# Patient Record
Sex: MALE | Race: WHITE | NOT HISPANIC OR LATINO | Employment: FULL TIME | URBAN - METROPOLITAN AREA
[De-identification: names, ages, dates, MRNs, and addresses within clinical notes are randomized per-mention and may not be internally consistent; named-entity substitution may affect disease eponyms.]

---

## 2019-04-30 DIAGNOSIS — R97.20 ELEVATED PROSTATE SPECIFIC ANTIGEN (PSA): Primary | ICD-10-CM

## 2023-03-14 ENCOUNTER — LAB REQUISITION (OUTPATIENT)
Dept: LAB | Facility: CLINIC | Age: 58
End: 2023-03-14
Payer: COMMERCIAL

## 2023-03-14 DIAGNOSIS — R22.32 LOCALIZED SWELLING, MASS AND LUMP, LEFT UPPER LIMB: ICD-10-CM

## 2023-03-14 PROCEDURE — 88304 TISSUE EXAM BY PATHOLOGIST: CPT | Mod: 26 | Performed by: PATHOLOGY

## 2023-03-14 PROCEDURE — 88304 TISSUE EXAM BY PATHOLOGIST: CPT | Mod: TC,ORL | Performed by: ORTHOPAEDIC SURGERY

## 2023-03-15 LAB
PATH REPORT.COMMENTS IMP SPEC: NORMAL
PATH REPORT.COMMENTS IMP SPEC: NORMAL
PATH REPORT.FINAL DX SPEC: NORMAL
PATH REPORT.GROSS SPEC: NORMAL
PATH REPORT.MICROSCOPIC SPEC OTHER STN: NORMAL
PATH REPORT.RELEVANT HX SPEC: NORMAL
PHOTO IMAGE: NORMAL

## 2023-09-07 ENCOUNTER — APPOINTMENT (OUTPATIENT)
Dept: CT IMAGING | Facility: CLINIC | Age: 58
End: 2023-09-07
Attending: EMERGENCY MEDICINE
Payer: COMMERCIAL

## 2023-09-07 ENCOUNTER — APPOINTMENT (OUTPATIENT)
Dept: MRI IMAGING | Facility: CLINIC | Age: 58
End: 2023-09-07
Attending: EMERGENCY MEDICINE
Payer: COMMERCIAL

## 2023-09-07 ENCOUNTER — HOSPITAL ENCOUNTER (OUTPATIENT)
Facility: CLINIC | Age: 58
Setting detail: OBSERVATION
Discharge: HOME | End: 2023-09-08
Attending: EMERGENCY MEDICINE | Admitting: HOSPITALIST
Payer: COMMERCIAL

## 2023-09-07 DIAGNOSIS — G45.9 TIA (TRANSIENT ISCHEMIC ATTACK): ICD-10-CM

## 2023-09-07 DIAGNOSIS — Q21.12 PFO (PATENT FORAMEN OVALE): ICD-10-CM

## 2023-09-07 DIAGNOSIS — I67.2 INTRACRANIAL ATHEROSCLEROSIS: Primary | ICD-10-CM

## 2023-09-07 DIAGNOSIS — R73.03 PREDIABETES: ICD-10-CM

## 2023-09-07 LAB
ANION GAP SERPL CALCULATED.3IONS-SCNC: 8 MMOL/L (ref 7–15)
ATRIAL RATE - MUSE: 60 BPM
BASOPHILS # BLD AUTO: 0 10E3/UL (ref 0–0.2)
BASOPHILS NFR BLD AUTO: 1 %
BUN SERPL-MCNC: 24.1 MG/DL (ref 8–23)
CALCIUM SERPL-MCNC: 10.5 MG/DL (ref 8.8–10.2)
CHLORIDE SERPL-SCNC: 104 MMOL/L (ref 98–107)
CREAT SERPL-MCNC: 1.04 MG/DL (ref 0.67–1.17)
DEPRECATED HCO3 PLAS-SCNC: 23 MMOL/L (ref 22–29)
DIASTOLIC BLOOD PRESSURE - MUSE: NORMAL MMHG
EGFRCR SERPLBLD CKD-EPI 2021: 82 ML/MIN/1.73M2
EOSINOPHIL # BLD AUTO: 0.2 10E3/UL (ref 0–0.7)
EOSINOPHIL NFR BLD AUTO: 3 %
ERYTHROCYTE [DISTWIDTH] IN BLOOD BY AUTOMATED COUNT: 13.6 % (ref 10–15)
GLUCOSE BLDC GLUCOMTR-MCNC: 92 MG/DL (ref 70–99)
GLUCOSE SERPL-MCNC: 91 MG/DL (ref 70–99)
HBA1C MFR BLD: 5.7 %
HCT VFR BLD AUTO: 40.3 % (ref 40–53)
HGB BLD-MCNC: 12.9 G/DL (ref 13.3–17.7)
HOLD SPECIMEN: NORMAL
HOLD SPECIMEN: NORMAL
IMM GRANULOCYTES # BLD: 0 10E3/UL
IMM GRANULOCYTES NFR BLD: 1 %
INTERPRETATION ECG - MUSE: NORMAL
LYMPHOCYTES # BLD AUTO: 1.2 10E3/UL (ref 0.8–5.3)
LYMPHOCYTES NFR BLD AUTO: 15 %
MCH RBC QN AUTO: 27.6 PG (ref 26.5–33)
MCHC RBC AUTO-ENTMCNC: 32 G/DL (ref 31.5–36.5)
MCV RBC AUTO: 86 FL (ref 78–100)
MONOCYTES # BLD AUTO: 0.8 10E3/UL (ref 0–1.3)
MONOCYTES NFR BLD AUTO: 10 %
NEUTROPHILS # BLD AUTO: 5.5 10E3/UL (ref 1.6–8.3)
NEUTROPHILS NFR BLD AUTO: 70 %
NRBC # BLD AUTO: 0 10E3/UL
NRBC BLD AUTO-RTO: 0 /100
P AXIS - MUSE: 41 DEGREES
PLATELET # BLD AUTO: 185 10E3/UL (ref 150–450)
POTASSIUM SERPL-SCNC: 4.4 MMOL/L (ref 3.4–5.3)
PR INTERVAL - MUSE: 144 MS
QRS DURATION - MUSE: 82 MS
QT - MUSE: 400 MS
QTC - MUSE: 400 MS
R AXIS - MUSE: 24 DEGREES
RBC # BLD AUTO: 4.67 10E6/UL (ref 4.4–5.9)
SODIUM SERPL-SCNC: 135 MMOL/L (ref 136–145)
SYSTOLIC BLOOD PRESSURE - MUSE: NORMAL MMHG
T AXIS - MUSE: 45 DEGREES
TROPONIN T SERPL HS-MCNC: 8 NG/L
VENTRICULAR RATE- MUSE: 60 BPM
WBC # BLD AUTO: 7.8 10E3/UL (ref 4–11)

## 2023-09-07 PROCEDURE — 70496 CT ANGIOGRAPHY HEAD: CPT

## 2023-09-07 PROCEDURE — 83036 HEMOGLOBIN GLYCOSYLATED A1C: CPT | Performed by: PHYSICIAN ASSISTANT

## 2023-09-07 PROCEDURE — 70498 CT ANGIOGRAPHY NECK: CPT

## 2023-09-07 PROCEDURE — 93005 ELECTROCARDIOGRAM TRACING: CPT

## 2023-09-07 PROCEDURE — 250N000011 HC RX IP 250 OP 636: Performed by: EMERGENCY MEDICINE

## 2023-09-07 PROCEDURE — 70553 MRI BRAIN STEM W/O & W/DYE: CPT

## 2023-09-07 PROCEDURE — G0378 HOSPITAL OBSERVATION PER HR: HCPCS

## 2023-09-07 PROCEDURE — 70450 CT HEAD/BRAIN W/O DYE: CPT

## 2023-09-07 PROCEDURE — 99285 EMERGENCY DEPT VISIT HI MDM: CPT | Mod: 25

## 2023-09-07 PROCEDURE — 85025 COMPLETE CBC W/AUTO DIFF WBC: CPT | Performed by: EMERGENCY MEDICINE

## 2023-09-07 PROCEDURE — 250N000009 HC RX 250: Performed by: EMERGENCY MEDICINE

## 2023-09-07 PROCEDURE — 80048 BASIC METABOLIC PNL TOTAL CA: CPT | Performed by: EMERGENCY MEDICINE

## 2023-09-07 PROCEDURE — 250N000013 HC RX MED GY IP 250 OP 250 PS 637: Performed by: EMERGENCY MEDICINE

## 2023-09-07 PROCEDURE — A9585 GADOBUTROL INJECTION: HCPCS | Performed by: EMERGENCY MEDICINE

## 2023-09-07 PROCEDURE — 255N000002 HC RX 255 OP 636: Performed by: EMERGENCY MEDICINE

## 2023-09-07 PROCEDURE — 99222 1ST HOSP IP/OBS MODERATE 55: CPT | Performed by: PHYSICIAN ASSISTANT

## 2023-09-07 PROCEDURE — 82962 GLUCOSE BLOOD TEST: CPT

## 2023-09-07 PROCEDURE — 84484 ASSAY OF TROPONIN QUANT: CPT | Performed by: EMERGENCY MEDICINE

## 2023-09-07 PROCEDURE — 36415 COLL VENOUS BLD VENIPUNCTURE: CPT | Performed by: EMERGENCY MEDICINE

## 2023-09-07 RX ORDER — MULTIPLE VITAMINS W/ MINERALS TAB 9MG-400MCG
1 TAB ORAL DAILY
COMMUNITY

## 2023-09-07 RX ORDER — GADOBUTROL 604.72 MG/ML
8 INJECTION INTRAVENOUS ONCE
Status: COMPLETED | OUTPATIENT
Start: 2023-09-07 | End: 2023-09-07

## 2023-09-07 RX ORDER — IOPAMIDOL 755 MG/ML
500 INJECTION, SOLUTION INTRAVASCULAR ONCE
Status: COMPLETED | OUTPATIENT
Start: 2023-09-07 | End: 2023-09-07

## 2023-09-07 RX ORDER — AMOXICILLIN 500 MG
1200 CAPSULE ORAL DAILY
COMMUNITY

## 2023-09-07 RX ORDER — ACETAMINOPHEN 325 MG/1
650 TABLET ORAL EVERY 4 HOURS PRN
Status: DISCONTINUED | OUTPATIENT
Start: 2023-09-07 | End: 2023-09-08 | Stop reason: HOSPADM

## 2023-09-07 RX ORDER — ASPIRIN 325 MG
325 TABLET ORAL ONCE
Status: COMPLETED | OUTPATIENT
Start: 2023-09-07 | End: 2023-09-07

## 2023-09-07 RX ORDER — CLOPIDOGREL BISULFATE 75 MG/1
300 TABLET ORAL ONCE
Status: COMPLETED | OUTPATIENT
Start: 2023-09-07 | End: 2023-09-07

## 2023-09-07 RX ORDER — LIDOCAINE 40 MG/G
CREAM TOPICAL
Status: DISCONTINUED | OUTPATIENT
Start: 2023-09-07 | End: 2023-09-08 | Stop reason: HOSPADM

## 2023-09-07 RX ADMIN — GADOBUTROL 8 ML: 604.72 INJECTION INTRAVENOUS at 12:10

## 2023-09-07 RX ADMIN — CLOPIDOGREL BISULFATE 300 MG: 75 TABLET ORAL at 14:52

## 2023-09-07 RX ADMIN — IOPAMIDOL 67 ML: 755 INJECTION, SOLUTION INTRAVENOUS at 11:20

## 2023-09-07 RX ADMIN — ASPIRIN 325 MG ORAL TABLET 325 MG: 325 PILL ORAL at 14:52

## 2023-09-07 RX ADMIN — SODIUM CHLORIDE 100 ML: 9 INJECTION, SOLUTION INTRAVENOUS at 11:20

## 2023-09-07 ASSESSMENT — ACTIVITIES OF DAILY LIVING (ADL)
ADLS_ACUITY_SCORE: 35
ADLS_ACUITY_SCORE: 31
ADLS_ACUITY_SCORE: 35
ADLS_ACUITY_SCORE: 31
ADLS_ACUITY_SCORE: 35

## 2023-09-07 NOTE — ED PROVIDER NOTES
"  History     Chief Complaint:  Generalized Weakness       HPI   Derek Abbasi is a 58 year old male who presents with generalized weakness and with \"feeling off\" since this morning around 915. At that time he took a shower after going for a walk and doing some push-ups when he began feeling generally weak and had trouble with his balance and brushing his teeth.  He noticed these issues more in the right arm as well as the right leg when he was trying to get his underwear on.  Here in the ER, he no longer has the symptoms, but his wife thinks he is slow to respond and \"seems twitchy.\" The patient denies shortness of breath, chest pain, and any recent illness.     Independent Historian:   None - Patient Only    Medications:    The patient is currently on no regular medications.    Past Medical History:    Elevated prostate specific antigen (PSA)  Normoc cystic anemia  Prostate cancer  Acute prostatitis    Past Surgical History:  Preoperative exam of left knee    Physical Exam   Patient Vitals for the past 24 hrs:   BP Temp Temp src Pulse Resp SpO2 Weight   09/07/23 1441 133/77 -- -- 71 -- 94 % --   09/07/23 1428 123/84 -- -- 70 22 93 % --   09/07/23 1413 116/69 -- -- 65 -- 93 % --   09/07/23 1358 101/60 -- -- 60 -- 95 % --   09/07/23 1343 125/68 -- -- 76 14 95 % --   09/07/23 1326 126/74 -- -- 63 19 97 % --   09/07/23 1259 -- -- -- 70 15 98 % --   09/07/23 1256 -- -- -- 70 15 96 % --   09/07/23 1247 (!) 143/79 -- -- 67 26 98 % --   09/07/23 1157 -- -- -- 60 20 96 % --   09/07/23 1019 (!) 144/82 -- -- -- -- -- --   09/07/23 1016 -- 97.7  F (36.5  C) Temporal 73 18 99 % 81.7 kg (180 lb 1.9 oz)        Physical Exam  VS: Reviewed per above  HENT: Mucous membranes moist, no nuchal rigidity  EYES: sclera anicteric  CV: Rate as noted, no regular rhythm.   RESP: Effort normal. Breath sounds are normal bilaterally.  GI: no tenderness/rebound/guarding, not distended.  NEURO: GCS 15, cranial nerves II through XII are intact, " 5 out of 5 strength in all 4 extremities, sensation is intact light touch in all 4 extremities. No ataxia, normal FNF testing BL.  MSK: No deformity of the extremities  SKIN: Warm and dry    Emergency Department Course   ECG:  ECG results from 09/07/23   EKG 12 lead     Value    Systolic Blood Pressure     Diastolic Blood Pressure     Ventricular Rate 60    Atrial Rate 60    IL Interval 144    QRS Duration 82        QTc 400    P Axis 41    R AXIS 24    T Axis 45    Interpretation ECG      Sinus rhythm  Normal ECG  No previous ECGs available         Imaging:  MR Brain w/o & w Contrast   Preliminary Result   IMPRESSION: Unremarkable brain MRI for age. No acute intracranial   process.      CTA Head Neck with Contrast   Preliminary Result   IMPRESSION:   1. No large vessel occlusion identified intracranially.   2. Moderate to severe focal stenosis at the origin of the right   anterior cerebral artery. Moderate focal stenosis of the adjacent   supraclinoid right internal carotid artery.   3. Mild to moderate focal stenosis of the mid to distal M1 segment of   the left middle cerebral artery. Mild stenoses in the P2 segment of   the left posterior cerebral artery.   4. No intracranial aneurysm or high flow vascular formation.   5. Patent cervical carotid and vertebral arteries without significant   stenosis.      Head CT w/o contrast   Preliminary Result   IMPRESSION: No CT findings of acute intracranial process.         Report per radiology    Laboratory:  Labs Ordered and Resulted from Time of ED Arrival to Time of ED Departure   BASIC METABOLIC PANEL - Abnormal       Result Value    Sodium 135 (*)     Potassium 4.4      Chloride 104      Carbon Dioxide (CO2) 23      Anion Gap 8      Urea Nitrogen 24.1 (*)     Creatinine 1.04      Calcium 10.5 (*)     Glucose 91      GFR Estimate 82     CBC WITH PLATELETS AND DIFFERENTIAL - Abnormal    WBC Count 7.8      RBC Count 4.67      Hemoglobin 12.9 (*)     Hematocrit 40.3   "    MCV 86      MCH 27.6      MCHC 32.0      RDW 13.6      Platelet Count 185      % Neutrophils 70      % Lymphocytes 15      % Monocytes 10      % Eosinophils 3      % Basophils 1      % Immature Granulocytes 1      NRBCs per 100 WBC 0      Absolute Neutrophils 5.5      Absolute Lymphocytes 1.2      Absolute Monocytes 0.8      Absolute Eosinophils 0.2      Absolute Basophils 0.0      Absolute Immature Granulocytes 0.0      Absolute NRBCs 0.0     TROPONIN T, HIGH SENSITIVITY - Normal    Troponin T, High Sensitivity 8     GLUCOSE BY METER - Normal    GLUCOSE BY METER POCT 92     GLUCOSE MONITOR NURSING POCT      Emergency Department Course & Assessments:     Interventions:  Medications   sodium chloride 0.9 % bag 500 mL for CT scan flush use (100 mLs As instructed $Given 9/7/23 1120)   iopamidol (ISOVUE-370) solution 500 mL (67 mLs Intravenous $Given 9/7/23 1120)   gadobutrol (GADAVIST) injection 8 mL (8 mLs Intravenous $Given 9/7/23 1210)   aspirin (ASA) tablet 325 mg (325 mg Oral $Given 9/7/23 1452)   clopidogrel (PLAVIX) tablet 300 mg (300 mg Oral $Given 9/7/23 1452)        Assessments:  1020 I obtained history and examined the patient, as noted above.  1310 I rechecked and updated the patient.        Consultations/Discussion of Management or Tests:  1418 I spoke with Dr. Frances from the Neurology Department about the patient's presentation, findings, and plan of care.  1445 I spoke with SHORTY Lee, admitting for Dr. Torres about the patient's presentation, findings, and plan of care.      Disposition:  The patient was admitted to the hospital under care of Dr. Torres, who accepted the patient for care.    Impression & Plan      Medical Decision Making:  Patient presents to the ER after episode of what seems to be right-sided weakness and trouble with coordination lasting about half an hour.  Vital signs reassuring.  Here in the ER, patient still feels \"off\" but does not have objective neuro findings.  " Initial CT of the head and CTA head and neck did show some nonspecific intracerebral areas of narrowing.  No ICH.  MRI did not show stroke.  Discussed with stroke neurology with recommendation for aspirin, Plavix load as well as admission for further TIA/stroke evaluation.    Critical Care time:  was 0 minutes for this patient excluding procedures.    Diagnosis:    ICD-10-CM    1. TIA (transient ischemic attack)  G45.9            Scribe Disclosure:  I, Franco Blanco, am serving as a scribe at 10:58 AM on 9/7/2023 to document services personally performed by Wilfredo Saucedo MD based on my observations and the provider's statements to me.      9/7/2023   Wilfredo Saucedo MD Lindenbaum, Elan, MD  09/07/23 1532

## 2023-09-07 NOTE — PLAN OF CARE
PRIMARY DIAGNOSIS: TIA  OUTPATIENT/OBSERVATION GOALS TO BE MET BEFORE DISCHARGE:  ADLs back to baseline: Yes    Activity and level of assistance: Ambulating independently - educated on fall risks, Pt declines fall interventions and wishes to independently walk halls, Pt denies all symptoms of TIA he was experiencing PTA    Pain status: Pain free.    Return to near baseline physical activity: Yes     Discharge Planner Nurse   Safe discharge environment identified: Yes  Barriers to discharge: Yes       Entered by: Radha Cunningham RN 09/07/2023 6:43 PM   Pt denies all symptoms of TIA experienced PTA.  AxOx4.  Requesting to ambulate unit independently.  Educated on fall risks.  Denies pain.    Please review provider order for any additional goals.   Nurse to notify provider when observation goals have been met and patient is ready for discharge.Goal Outcome Evaluation:

## 2023-09-07 NOTE — ED NOTES
"[facility]  ED Nurse Handoff Report    ED Chief complaint: Generalized Weakness  . ED Diagnosis:   Final diagnoses:   TIA (transient ischemic attack)       Allergies: No Known Allergies    Code Status: Full Code    Activity level - Baseline/Home:  independent.  Activity Level - Current:   independent.   Lift room needed: No.   Bariatric: No   Needed: No   Isolation: No.   Infection: Not Applicable.     Respiratory status: Room air    Vital Signs (within 30 minutes):   Vitals:    09/07/23 1343 09/07/23 1358 09/07/23 1413 09/07/23 1428   BP: 125/68 101/60 116/69 123/84   Pulse: 76 60 65 70   Resp: 14   22   Temp:       TempSrc:       SpO2: 95% 95% 93% 93%   Weight:           Cardiac Rhythm:  ,      Pain level:    Patient confused: No.   Patient Falls Risk: nonskid shoes/slippers when out of bed.   Elimination Status: Has voided     Patient Report - Initial Complaint: Generalized Weakness   Focused Assessment: Derek Abbasi is a 58 year old male who presents with generalized weakness, notably \"feeling off\" since this morning around 915. At that time he took a shower after going for a walk and doing some push-ups when he began feeling generally weak and had trouble with his balance and brushing his teeth. Here in the ER, he no longer has the symptoms, but his wife thinks he is slow to respond and \"seems twitchy.\" The patient denies shortness of breath, chest pain, and any recent illness.      Abnormal Results:   Labs Ordered and Resulted from Time of ED Arrival to Time of ED Departure   BASIC METABOLIC PANEL - Abnormal       Result Value    Sodium 135 (*)     Potassium 4.4      Chloride 104      Carbon Dioxide (CO2) 23      Anion Gap 8      Urea Nitrogen 24.1 (*)     Creatinine 1.04      Calcium 10.5 (*)     Glucose 91      GFR Estimate 82     CBC WITH PLATELETS AND DIFFERENTIAL - Abnormal    WBC Count 7.8      RBC Count 4.67      Hemoglobin 12.9 (*)     Hematocrit 40.3      MCV 86   "    MCH 27.6      MCHC 32.0      RDW 13.6      Platelet Count 185      % Neutrophils 70      % Lymphocytes 15      % Monocytes 10      % Eosinophils 3      % Basophils 1      % Immature Granulocytes 1      NRBCs per 100 WBC 0      Absolute Neutrophils 5.5      Absolute Lymphocytes 1.2      Absolute Monocytes 0.8      Absolute Eosinophils 0.2      Absolute Basophils 0.0      Absolute Immature Granulocytes 0.0      Absolute NRBCs 0.0     TROPONIN T, HIGH SENSITIVITY - Normal    Troponin T, High Sensitivity 8     GLUCOSE BY METER - Normal    GLUCOSE BY METER POCT 92     GLUCOSE MONITOR NURSING POCT        MR Brain w/o & w Contrast   Preliminary Result   IMPRESSION: Unremarkable brain MRI for age. No acute intracranial   process.      CTA Head Neck with Contrast   Preliminary Result   IMPRESSION:   1. No large vessel occlusion identified intracranially.   2. Moderate to severe focal stenosis at the origin of the right   anterior cerebral artery. Moderate focal stenosis of the adjacent   supraclinoid right internal carotid artery.   3. Mild to moderate focal stenosis of the mid to distal M1 segment of   the left middle cerebral artery. Mild stenoses in the P2 segment of   the left posterior cerebral artery.   4. No intracranial aneurysm or high flow vascular formation.   5. Patent cervical carotid and vertebral arteries without significant   stenosis.      Head CT w/o contrast   Preliminary Result   IMPRESSION: No CT findings of acute intracranial process.          Treatments provided: Plavix, ASA  Family Comments: Wife at bedside.   OBS brochure/video discussed/provided to patient:  Yes  ED Medications:   Medications   sodium chloride 0.9 % bag 500 mL for CT scan flush use (100 mLs As instructed $Given 9/7/23 1120)   aspirin (ASA) tablet 325 mg (has no administration in time range)   clopidogrel (PLAVIX) tablet 300 mg (has no administration in time range)   iopamidol (ISOVUE-370) solution 500 mL (67 mLs Intravenous  $Given 9/7/23 1120)   gadobutrol (GADAVIST) injection 8 mL (8 mLs Intravenous $Given 9/7/23 1210)       Drips infusing:  No  For the majority of the shift this patient was Green.   Interventions performed were N/A.    Sepsis treatment initiated: No    Cares/treatment/interventions/medications to be completed following ED care: See Orders    ED Nurse Name: Carito Turner RN  2:47 PM   RECEIVING UNIT ED HANDOFF REVIEW    Above ED Nurse Handoff Report was reviewed: Yes  Reviewed by: Radha Cunningham RN on September 7, 2023 at 3:12 PM

## 2023-09-07 NOTE — ED NOTES
"PIT/Triage Evaluation    Patient presented with feeling \"off\".  This morning around 915, patient was in the shower after going for a walk and doing some push-ups when he felt generally weak and had trouble brushing his teeth and had trouble with his balance.  Here in the ER he no longer has the symptoms but his wife thinks he seems \"twitchy\" and is slower to respond to things.    Exam is notable for:    Patient Vitals for the past 24 hrs:   BP Temp Temp src Pulse Resp SpO2 Weight   09/07/23 1019 (!) 144/82 -- -- -- -- -- --   09/07/23 1016 -- 97.7  F (36.5  C) Temporal 73 18 99 % 81.7 kg (180 lb 1.9 oz)     No facial asymmetry, EOMI, no drift in the extremities, normal finger-nose-finger testing bilaterally, no ataxia, sensation intact to light touch in all 4 extremities.    Appropriate interventions for symptom management were initiated if applicable.  Appropriate diagnostic tests were initiated if indicated.    Important information for subsequent clinician:  Patient presents after episode of neurological symptoms.  Here in the ER lingering deficits include feeling \"twitchy\" as well as \"slow\".  I do not appreciate focality and thus I do not believe he is suffering from CVA.  Furthermore I discussed the risks of thrombolytics and we agreed that risk would outweigh any potential benefit profile for his current symptoms. TIA is on the differential.  CT/CTA will be ordered patient awaits ER bed.    I briefly evaluated the patient and developed an initial plan of care. I discussed this plan and explained that this brief interaction does not constitute a full evaluation. Patient/family understands that they should wait to be fully evaluated and discuss any test results with another clinician prior to leaving the hospital.       Wilfredo Saucedo MD  09/07/23 1036    "

## 2023-09-07 NOTE — PHARMACY-ADMISSION MEDICATION HISTORY
Pharmacist Admission Medication History    Admission medication history is complete. The information provided in this note is only as accurate as the sources available at the time of the update.    Medication reconciliation/reorder completed by provider prior to medication history? No    Information Source(s): Patient via in-person    Pertinent Information: None    Changes made to PTA medication list:  Added: All  Deleted: None  Changed: None    Medication Affordability:  Not including over the counter (OTC) medications, was there a time in the past 3 months when you did not take your medications as prescribed because of cost?: No    Allergies reviewed with patient and updates made in EHR: yes    Medication History Completed By: Toyin Null RPH 9/7/2023 3:17 PM    Prior to Admission medications    Medication Sig Last Dose Taking? Auth Provider Long Term End Date   multivitamin w/minerals (THERA-VIT-M) tablet Take 1 tablet by mouth daily 9/7/2023 at am Yes Unknown, Entered By History     Omega-3 Fatty Acids (FISH OIL) 1200 MG capsule Take 1,200 mg by mouth daily 9/7/2023 at am Yes Unknown, Entered By History

## 2023-09-07 NOTE — ED TRIAGE NOTES
Pt awoke this morning, went on a walk, and completed daily exercise. Shortly after pt c/o dizziness, difficulty concentrating, and difficulty brushing teeth. While getting EKG pt had an episode where he was staring off and did not answer ERT. Pt also c/o fidgeting and shaking. Denies hx of seizures or CVA. Stat stroke evaluation called in triage 2. ABC in tact. A/OX4

## 2023-09-07 NOTE — PLAN OF CARE
ROOM # 213-1    Living Situation (if not independent, order SW consult):  Facility name:  : N/A    Activity level at baseline: Ind    Activity level on admit: SBA    Who will be transporting you at discharge: Wife    Patient registered to observation; given Patient Bill of Rights; given the opportunity to ask questions about observation status and their plan of care.  Patient has been oriented to the observation room, bathroom and call light is in place.    Discussed discharge goals and expectations with patient/family.         Goal Outcome Evaluation:

## 2023-09-07 NOTE — H&P
[facility]    History and Physical - Hospitalist Service       Date of Admission:  9/7/2023    Assessment & Plan Derek Abbasi is a 58 year old male who denies any significant Pmhx, who was admitted on 9/7/2023 after presenting for evaluation of atypical neurological symptoms of disorientation, global weakness, coordination impairment.     Atypical Neurologic Symptoms   Admitted obs for TIA work up although symptoms not classic for TIA. Episode of incoordination, global weakness bilateral LE, possible involuntary twitching.  Associated lightheadedness, no chest pain, syncope or dyspnea.  Occurred after physical exertion with push ups.  Symptoms resolved at the time evaluated in the ED code stroke negative.    Vascular neurology was consulted given dual antiplatelet therapy admitted observation.  -Vascular neurology consult  -check orthostatic BP   -Stroke order set initiated discontinued unnecessary measures given he is asymptomatic now and will not need therapy evals  -Cardiac telemetry    Cerebral Artery Stenosis  Noted on CTA, no large vessel occlusion.  Unclear if related to presenting symptoms.  Moderate to severe focal stenosis origin of right RANDAL, right ICA. Mild to moderate focal stenosis of the mid to distal M1 segment of the left middle cerebral artery. Mild stenoses in the P2 segment of the left posterior cerebral artery.  -Defer further antiplatelet therapy to vascular neurology recommendations       Diet:  regular     DVT Prophylaxis: Ambulate every shift  Glass Catheter: Not present    Cardiac Monitoring: None    Code Status:  Full Code     Clinically Significant Risk Factors Present on Admission           # Hypercalcemia: Highest Ca = 10.5 mg/dL in last 2 days, will monitor as appropriate                        Disposition Plan      Expected Discharge Date: 09/08/2023                  Kierra Coronel  "PA-C    ______________________________________________________________________    Chief Complaint   \"Disorientation, gait problem\"    History is obtained from the patient    History of Present Illness   Derek Abbasi is a 58 year old male who was referred to the emergency department from urgent care for evaluation of weakness, balance problem.  Dad woke up in his usual state of health.  This morning around 830 he had a bowl of oatmeal.  He went out for a walk with his dog.  He was feeling normal.  On return home he had been doing push-ups which she had not done for a while.  He started to feel queasy like he does when he physically exerts himself.  He got into the shower when he noticed he had weakness in both of his arms he had difficulty squeegee in the shower doors on his way out.  He reported feeling \"off \"fuzzy \"equated to lightheadedness.  No LOC. He had no palpitations, chest pain or shortness of breath during this episode.  After the episode his wife noted that he was may be doing some involuntary twitching.  Most of this had resolved by the time he was evaluated in urgent care he was walking normally.  He said that he had felt as if he had been drinking alcohol although he hadn't been. Dressing was initially difficulty due to coordination problems.  No nausea, headache, vision changes.  Weakness was not unilateral.  No history of stroke, seizure, syncopal episode.  He reports being healthy, physically active.  He reports that his blood pressures are usually on the low end.  He has no residual symptoms in the ED or on my evaluation.  On presentation symptoms largely resolved. Stroke Neurology was consulted. Code stroke de-escalated   In the ED given ASA, plavix.     Admission was requested from hospitalist service for \"TIA\".    Past Medical History    As above    Past Surgical History   As above.     Prior to Admission Medications   None        Review of Systems    The 10 point Review of Systems is " negative other than noted in the HPI or here.     Social History   I have reviewed this patient's social history and updated it with pertinent information if needed. Lives with wife, semi-retired works 2 days/week. No tobacco, alcohol use.          Physical Exam   Vital Signs: Temp: 97.7  F (36.5  C) Temp src: Temporal BP: 126/74 Pulse: 63   Resp: 19 SpO2: 97 % O2 Device: None (Room air)    Weight: 180 lbs 1.85 oz    Constitutional: Awake, alert,  no apparent distress.  Eyes: Conjunctiva and pupils examined and normal.  HEENT: Moist mucous membranes, normal dentition.  Respiratory: Clear to auscultation bilaterally, no crackles or wheezing.  Cardiovascular: Regular rate and rhythm, normal S1 and S2, and no murmur noted.  GI: Soft, non-distended, non-tender, bowel sounds present. No rebound tenderness or guarding.  Lymph/Hematologic: No anterior cervical or supraclavicular adenopathy.  Skin: No rashes, no cyanosis, no edema.  Musculoskeletal: No deformities noted.  No erythema or tenderness. Moving all extremities.  Neurologic: No focal deficits noted. Speech is clear. Coordination and strength grossly normal.   Psychiatric: Appropriate affect.       Medical Decision Making       65 MINUTES SPENT BY ME on the date of service doing chart review, history, exam, documentation & further activities per the note.      Data   ------------------------- PAST 24 HR DATA REVIEWED -----------------------------------------------

## 2023-09-08 ENCOUNTER — APPOINTMENT (OUTPATIENT)
Dept: CARDIOLOGY | Facility: CLINIC | Age: 58
End: 2023-09-08
Attending: PHYSICIAN ASSISTANT
Payer: COMMERCIAL

## 2023-09-08 VITALS
WEIGHT: 179 LBS | HEART RATE: 67 BPM | BODY MASS INDEX: 25.62 KG/M2 | DIASTOLIC BLOOD PRESSURE: 71 MMHG | SYSTOLIC BLOOD PRESSURE: 134 MMHG | OXYGEN SATURATION: 96 % | HEIGHT: 70 IN | TEMPERATURE: 98.3 F | RESPIRATION RATE: 18 BRPM

## 2023-09-08 LAB
AMPHETAMINES UR QL SCN: NORMAL
ANION GAP SERPL CALCULATED.3IONS-SCNC: 7 MMOL/L (ref 7–15)
BARBITURATES UR QL SCN: NORMAL
BENZODIAZ UR QL SCN: NORMAL
BUN SERPL-MCNC: 21.4 MG/DL (ref 8–23)
BZE UR QL SCN: NORMAL
CALCIUM SERPL-MCNC: 9.7 MG/DL (ref 8.8–10.2)
CANNABINOIDS UR QL SCN: NORMAL
CHLORIDE SERPL-SCNC: 105 MMOL/L (ref 98–107)
CHOLEST SERPL-MCNC: 139 MG/DL
CREAT SERPL-MCNC: 0.98 MG/DL (ref 0.67–1.17)
DEPRECATED HCO3 PLAS-SCNC: 24 MMOL/L (ref 22–29)
EGFRCR SERPLBLD CKD-EPI 2021: 88 ML/MIN/1.73M2
ERYTHROCYTE [DISTWIDTH] IN BLOOD BY AUTOMATED COUNT: 13.6 % (ref 10–15)
GLUCOSE BLDC GLUCOMTR-MCNC: 95 MG/DL (ref 70–99)
GLUCOSE SERPL-MCNC: 94 MG/DL (ref 70–99)
HCT VFR BLD AUTO: 39.6 % (ref 40–53)
HDLC SERPL-MCNC: 40 MG/DL
HGB BLD-MCNC: 13.1 G/DL (ref 13.3–17.7)
LDLC SERPL CALC-MCNC: 88 MG/DL
LVEF ECHO: NORMAL
MCH RBC QN AUTO: 28.4 PG (ref 26.5–33)
MCHC RBC AUTO-ENTMCNC: 33.1 G/DL (ref 31.5–36.5)
MCV RBC AUTO: 86 FL (ref 78–100)
NONHDLC SERPL-MCNC: 99 MG/DL
OPIATES UR QL SCN: NORMAL
PCP QUAL URINE (ROCHE): NORMAL
PLAT MORPH BLD: NORMAL
PLATELET # BLD AUTO: 165 10E3/UL (ref 150–450)
POTASSIUM SERPL-SCNC: 4.5 MMOL/L (ref 3.4–5.3)
RBC # BLD AUTO: 4.62 10E6/UL (ref 4.4–5.9)
RBC MORPH BLD: NORMAL
SODIUM SERPL-SCNC: 136 MMOL/L (ref 136–145)
TRIGL SERPL-MCNC: 55 MG/DL
WBC # BLD AUTO: 6.7 10E3/UL (ref 4–11)

## 2023-09-08 PROCEDURE — 250N000013 HC RX MED GY IP 250 OP 250 PS 637: Performed by: PHYSICIAN ASSISTANT

## 2023-09-08 PROCEDURE — G0378 HOSPITAL OBSERVATION PER HR: HCPCS

## 2023-09-08 PROCEDURE — 80061 LIPID PANEL: CPT | Performed by: PHYSICIAN ASSISTANT

## 2023-09-08 PROCEDURE — 999N000226 HC STATISTIC SLP IP EVAL DEFER

## 2023-09-08 PROCEDURE — 82962 GLUCOSE BLOOD TEST: CPT

## 2023-09-08 PROCEDURE — 999N000208 ECHOCARDIOGRAM COMPLETE

## 2023-09-08 PROCEDURE — 258N000001 HC RX 258: Performed by: HOSPITALIST

## 2023-09-08 PROCEDURE — 80307 DRUG TEST PRSMV CHEM ANLYZR: CPT | Performed by: PHYSICIAN ASSISTANT

## 2023-09-08 PROCEDURE — 99239 HOSP IP/OBS DSCHRG MGMT >30: CPT | Performed by: NURSE PRACTITIONER

## 2023-09-08 PROCEDURE — 93306 TTE W/DOPPLER COMPLETE: CPT

## 2023-09-08 PROCEDURE — 93306 TTE W/DOPPLER COMPLETE: CPT | Mod: 26 | Performed by: INTERNAL MEDICINE

## 2023-09-08 PROCEDURE — 36415 COLL VENOUS BLD VENIPUNCTURE: CPT | Performed by: PHYSICIAN ASSISTANT

## 2023-09-08 PROCEDURE — 93272 ECG/REVIEW INTERPRET ONLY: CPT | Performed by: INTERNAL MEDICINE

## 2023-09-08 PROCEDURE — 93270 REMOTE 30 DAY ECG REV/REPORT: CPT

## 2023-09-08 PROCEDURE — 80048 BASIC METABOLIC PNL TOTAL CA: CPT | Performed by: PHYSICIAN ASSISTANT

## 2023-09-08 PROCEDURE — 85027 COMPLETE CBC AUTOMATED: CPT | Performed by: PHYSICIAN ASSISTANT

## 2023-09-08 PROCEDURE — 99204 OFFICE O/P NEW MOD 45 MIN: CPT | Mod: G0 | Performed by: PHYSICIAN ASSISTANT

## 2023-09-08 RX ORDER — ATORVASTATIN CALCIUM 40 MG/1
40 TABLET, FILM COATED ORAL EVERY EVENING
Qty: 30 TABLET | Refills: 3 | Status: SHIPPED | OUTPATIENT
Start: 2023-09-08 | End: 2023-09-28

## 2023-09-08 RX ORDER — ATORVASTATIN CALCIUM 40 MG/1
40 TABLET, FILM COATED ORAL EVERY EVENING
Status: DISCONTINUED | OUTPATIENT
Start: 2023-09-08 | End: 2023-09-08 | Stop reason: HOSPADM

## 2023-09-08 RX ORDER — ASPIRIN 325 MG
325 TABLET, DELAYED RELEASE (ENTERIC COATED) ORAL DAILY
Status: DISCONTINUED | OUTPATIENT
Start: 2023-09-08 | End: 2023-09-08 | Stop reason: HOSPADM

## 2023-09-08 RX ORDER — ACETAMINOPHEN 325 MG/1
650 TABLET ORAL EVERY 4 HOURS PRN
COMMUNITY
Start: 2023-09-08

## 2023-09-08 RX ORDER — CLOPIDOGREL BISULFATE 75 MG/1
75 TABLET ORAL DAILY
Qty: 30 TABLET | Refills: 3 | Status: SHIPPED | OUTPATIENT
Start: 2023-09-09 | End: 2023-10-24

## 2023-09-08 RX ORDER — ASPIRIN 325 MG
325 TABLET, DELAYED RELEASE (ENTERIC COATED) ORAL DAILY
Qty: 30 TABLET | Status: SHIPPED | OUTPATIENT
Start: 2023-09-09 | End: 2023-10-24

## 2023-09-08 RX ORDER — CLOPIDOGREL BISULFATE 75 MG/1
75 TABLET ORAL DAILY
Status: DISCONTINUED | OUTPATIENT
Start: 2023-09-08 | End: 2023-09-08 | Stop reason: HOSPADM

## 2023-09-08 RX ORDER — ACYCLOVIR 200 MG/1
30 CAPSULE ORAL ONCE
Status: COMPLETED | OUTPATIENT
Start: 2023-09-08 | End: 2023-09-08

## 2023-09-08 RX ADMIN — SODIUM CHLORIDE 30 ML: 9 INJECTION, SOLUTION INTRAMUSCULAR; INTRAVENOUS; SUBCUTANEOUS at 11:39

## 2023-09-08 RX ADMIN — ASPIRIN 325 MG: 325 TABLET, DELAYED RELEASE ORAL at 11:39

## 2023-09-08 RX ADMIN — CLOPIDOGREL BISULFATE 75 MG: 75 TABLET ORAL at 15:15

## 2023-09-08 ASSESSMENT — ACTIVITIES OF DAILY LIVING (ADL)
ADLS_ACUITY_SCORE: 31

## 2023-09-08 NOTE — PLAN OF CARE
PRIMARY DIAGNOSIS: TIA  OUTPATIENT/OBSERVATION GOALS TO BE MET BEFORE DISCHARGE:  1. Orthostatic performed: N/A    2. Diagnostic testing complete & at baseline neurologic testing: Yes    3. Cleared by consultants (if involved): No    4. Interpretation of cardiac rhythm per telemetry tech: SB/SR    5. Tolerating adequate PO diet and medications: Yes    6. Return to near baseline physical activity or neurologic status: Yes    Discharge Planner Nurse   Safe discharge environment identified: Yes  Barriers to discharge: Yes       Entered by: Floridalma Ugalde RN 09/08/2023      Please review provider order for any additional goals.   Nurse to notify provider when observation goals have been met and patient is ready for discharge.

## 2023-09-08 NOTE — PLAN OF CARE
PRIMARY DIAGNOSIS: TIA  OUTPATIENT/OBSERVATION GOALS TO BE MET BEFORE DISCHARGE:  1. Orthostatic performed: N/A    2. Diagnostic testing complete & at baseline neurologic testing: Yes    3. Cleared by consultants (if involved): No    4. Interpretation of cardiac rhythm per telemetry tech: SB/SR    5. Tolerating adequate PO diet and medications: Yes    6. Return to near baseline physical activity or neurologic status: Yes    Discharge Planner Nurse   Safe discharge environment identified: Yes  Barriers to discharge: Yes       Entered by: Floridalma Ugalde RN 09/08/2023      Please review provider order for any additional goals.   Nurse to notify provider when observation goals have been met and patient is ready for discharge.    AOx4. Up ad jennifer. Neuros WNL. SB/SR per tele. Wife at bedside. Echo complete- results pending. Tele stroke complete-awaiting recommendations.

## 2023-09-08 NOTE — PROGRESS NOTES
SUBJECTIVE:                                                    Homer Wells is a 47 year old male who presents to clinic today for the following health issues:    Depression and Anxiety Follow-Up      Status since last visit: No change    Other associated symptoms:None    Complicating factors:     Significant life event: No     Current substance abuse: alcohol 3++ beers 3-4 nites a wk     meds i past:    paxil -no help and wt gain    efefxor- helped but late to lack of ejaculation     PHQ-9 SCORE 6/4/2015 3/21/2016 11/15/2016   Total Score 2 - -   Total Score - 2 8     No flowsheet data found.     PHQ-9  English =14     PHQ-9   Any Language     GAD7=11       Hypertension Follow-up      Outpatient blood pressures are being checked at home.  Results are 133/78, 148/88.    Low Salt Diet: no added salt     RT Sided Low Back Pain        Duration: Ongoing        Specific cause: none    Description:   Location of pain: low back right  Character of pain: sharp  Pain radiation:none  New numbness or weakness in legs, not attributed to pain:  no     Intensity: Currently 0-1/10, worst 8/10-when spasming    History:   Pain interferes with job: YES  History of back problems: patient has prior back spasms  Any previous MRI or X-rays: yes-very long time ago, pt does not remember when  Sees a specialist for back pain:  No  Therapies tried without relief: naproxen, heat and ice    Alleviating factors:   Improved by: laying down      Precipitating factors:  Worsened by: Bending and Sitting    Functional and Psychosocial Screen (González STarT Back):      Not performed today   Accompanying Signs & Symptoms:  Risk of Fracture:  None  Risk of Cauda Equina:  None  Risk of Infection:  None  Risk of Cancer:  None  Risk of Ankylosing Spondylitis:  Onset at age <35, male, AND morning back stiffness. no                Amount of exercise or physical activity: None    Problems taking medications regularly: No    Medication side effects:  30-day Event Monitor placed.   none    Diet: regular (no restrictions)    Gout  Duration: decades but no episodes for 10 + yrs despite drinking alcohol   Description   Location: big toe - bilateral  Joint Swelling: YES  Redness: YES  Pain intensity:  moderate  Accompanying signs and symptoms: None  History  Previous history of gout: YES   Trauma to the area: no   Precipitating factors:   Alcohol usage: Frequent  Diuretic use: no   Recent illness: no   Therapies tried and outcome: allopurinol 300mgm a day and no gout since started      ALCOHOL ABUSE      Duration: LATE TEENS     Description (location/character/radiation): off 3029-8542 but back on 3+++ beer 3-4 nites a wk     Intensity:  severe    Accompanying signs and symptoms: 0    History (similar episodes/previous evaluation): None    Precipitating or alleviating factors: None    Therapies tried and outcome: AA in past        HYPOXIA/SLEEP APNEA       Duration: yrs     Description (location/character/radiation): oximetry 95%     Intensity:  mild    Accompanying signs and symptoms: fatigue     History (similar episodes/previous evaluation): None    Precipitating or alleviating factors: morbidly obese     Therapies tried and outcome:  c Pap q nite      TOBACCO ABUSE    -1-2 ppd since 16 - 30 y/o   - sister is dying of lung ca at 51 y/o = a smoker   -assymptomatic     MORBID OBESITY    -BMI = 43  -comorbid HTN   -hypoxic to 95%     FAMILY HX OF COLON CA    -father with it at 64 y/o   -pt had 1st colonoscopy in 2003   - so is due again     Problem list and histories reviewed & adjusted, as indicated.  Additional history: as documented    Labs reviewed in EPIC    Reviewed and updated as needed this visit by clinical staff  Allergies  Meds  Problems       Reviewed and updated as needed this visit by Provider  Allergies  Meds  Problems         ROS:  C: NEGATIVE for fever, chills, change in weight- conts to gain on beer   I: NEGATIVE for worrisome rashes, moles or lesions  E: NEGATIVE for  "vision changes or irritation  E/M: NEGATIVE for ear, mouth and throat problems  R: NEGATIVE for significant cough or SOB  B: NEGATIVE for masses, tenderness or discharge  CV: NEGATIVE for chest pain, palpitations or peripheral edema  GI: NEGATIVE for nausea, abdominal pain, heartburn, or change in bowel habits  : NEGATIVE for frequency, dysuria, or hematuria  M: NEGATIVE for significant arthralgias or myalgia  N: NEGATIVE for weakness, dizziness or paresthesias  E: NEGATIVE for temperature intolerance, skin/hair changes  H: NEGATIVE for bleeding problems  P: NEGATIVE for changes in mood or affect    OBJECTIVE:                                                    /86  Pulse 71  Temp 97.3  F (36.3  C) (Tympanic)  Resp 14  Ht 6' 1.5\" (1.867 m)  Wt (!) 327 lb (148.3 kg)  SpO2 96%  BMI 42.56 kg/m2  Body mass index is 42.56 kg/(m^2).  GENERAL: healthy, alert, no distress, obese and fatigued  EYES: Eyes grossly normal to inspection, PERRL and conjunctivae and sclerae normal  RESP: lungs clear to auscultation - no rales, rhonchi or wheezes  CV: regular rate and rhythm, normal S1 S2, no S3 or S4, no murmur, click or rub, no peripheral edema and peripheral pulses strong  ABDOMEN: soft, nontender, no hepatosplenomegaly, no masses and bowel sounds normal-protuberant   MS: no gross musculoskeletal defects noted, no edema  SKIN: no suspicious lesions or rashes  NEURO: Normal strength and tone, mentation intact and speech normal  PSYCH: mentation appears normal, affect normal/bright    Diagnostic Test Results:  No results found for this or any previous visit (from the past 24 hour(s)).     ASSESSMENT/PLAN:                                                              ICD-10-CM    1. Recurrent major depressive disorder, in partial remission (H)-ISSUE OF treating  F33.41 citalopram (CELEXA) 20 MG tablet   2. Anxiety state F41.1    3. Alcohol abuse since teens -off 9087-7997 F10.10 ALT     AST     Glucose   4. Essential " hypertension I10 metoprolol (TOPROL-XL) 100 MG 24 hr tablet   5. Morbid obesity due to excess calories (H):BMI 40-50 E66.01    6. Essential hypertension with goal blood pressure less than 140/90- borderline control -need to watch  I10 lisinopril (PRINIVIL,ZESTRIL) 30 MG tablet   7. 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 Hypoxia R09.02    9. Obstructive sleep apnea syndrome G47.33    10. Personal history of tobacco use, presenting hazards to health: [age]y/o @ 1 -2ppd=25-30 pk yr hx Z87.891    11. 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 Chronic right-sided low back pain without sciatica M54.5     G89.29    13. Family history of colon cancer: father at 62y/o  Z80.0    14. Screening for diabetes mellitus Z13.1 Glucose       Patient Instructions   1.  Weight Loss Tips  1. Do not eat after 6 hrs before your expected bedtime  2. Have your heaviest meal for breakfast, a slightly lighter meal at lunch and a snack 6 hrs before bed  3. No sugar/calorie drinks except milk ie no fruit juice, pop, alcohol.  4. Drink milk 30min before meals to decrease your hunger. Also it is excellent as part of your last meal of the day snack  5. Drink lots of water  6. Increase fiber in diet: all bran cereal, salads, popcorn etc  7. Have only one small serving of fruit a day about 1/2 cup (as this is high in sugar)  8. EXERCISE is the bottom line. Without it, you will gain weight even on a low calorie diet. Best if done 2-3X a day as can    Being overweight contributes to high blood pressure and high cholesterol, both of which cause heart attacks, strokes and kidney failure, prediabetes and diabetes, arthritis, and liver disease     2. Start the celexa  20mgm / tab   At 1/2 tab for 2-4 days   To alleviate the anxiety     See me in 2 weeks     YOU WILL NEED A COLONOSCOPY       Liliana Mattson,  "MD  [facility]    Weight management plan: Discussed healthy diet and exercise guidelines and patient will follow up in 1 month in clinic to re-evaluate.    DISCUSSION     1. Pt with recurrent alcohol abuse since teens   Started with [facility]   Was off it 1999 - 2012 but no back to it as above 3++ beer 3-4 nites a wk   Has gained wt since drinking again   Feels more depressed   States is depressed and mean \" dry drunk\" when off it   Worried as is having trouble as is obstreperous at work --feels is from the depression  Worried as is a \"mean dry drunk\"   Did encourage him to quit and he assured me that he wanted to and would as always did it in the past on AA alone     2. Depression   Past Rx include effexor which worked but caused late to lack of ejaculation   paxil--did cause more wt gain     3. Hypoxia  -has obstructive sleep apnea and does use his cPap   -morbidly obese which compresses the lungs     4. HTN   Need to watch closely as is borderline high and may need more meds     Time spent with the patient 44mins, more than 50% in counseling and coordinating care, Re above medical problems.  Spent time reviewing problems, prior meds and discussing with pt and setting u p future meds and RX            Liliana Mattson MD    "

## 2023-09-08 NOTE — CONSULTS
Care Management Discharge Note    Discharge Date: 09/08/2023     Additional Information:  Per discussion in care rounds, patient is back to baseline. Patient does not have any SW needs at this time.     Please consult care management if needs arise.     VIDYA Sharma, LGSW  Emergency Room   Please contact the SW on the floor in which the patient is staying for any questions or concerns

## 2023-09-08 NOTE — PLAN OF CARE
PRIMARY DIAGNOSIS: TIA  OUTPATIENT/OBSERVATION GOALS TO BE MET BEFORE DISCHARGE:  ADLs back to baseline: Yes    Activity and level of assistance: Ambulating independently.    Pain status: Pain free.    Return to near baseline physical activity: Yes     Discharge Planner Nurse   Safe discharge environment identified: Yes  Barriers to discharge: No       Entered by: Ruthie Castelan RN 09/08/2023     Pt is A&Ox4. VSS on RA. Regular diet. Pt is independent in room. Pt up walking halls with SBA. Tele: SR 55.     Please review provider order for any additional goals.   Nurse to notify provider when observation goals have been met and patient is ready for discharge.

## 2023-09-08 NOTE — CONSULTS
"[facility]    Stroke Consult Note    Reason for Consult:  concern for TIA    Chief Complaint: Generalized Weakness       HPI  Derek Abbasi is a 58 year old male with PMH prostate cancer (diagnosed 4-5 years ago, in active surveillance program, no current treatment), presented with generalized weakness, balance issues and feeling \"off\" yesterday (9/8) morning. He went for a walk, ate breakfast, then did some push ups which he does daily before taking a shower but yesterday noted some nausea which he states can occur when he over exerts himself. He then got in the shower and noted a hard time lifting hands to shampoo (thinks both hands but unsure), felt out of it, when he got out had a hard time cleaning the glass door with the R hand, then had a hard time brushing teeth with R hand and afterward had problems putting the toothpaste away (describes this was more a feeling of not knowing how to do it). He is unsure whether he noticed these difficulties with his R hand because he is R  handed and typically does these tasks with the R hand or whether only the R side was having problems. When getting dressed he put underwear onto his left leg first but then took him several tries to get it onto his R leg. He noted some difficulty with balance as well. These symptoms lasted maybe 15 minutes. He and his wife then noted that he developed some twitching/jerking on the R side involving his foot and R shoulder, at that time remained slower to respond and \"out of it\". These symptoms maybe lasted about an hour. Presenting /82. No headaches. No recent fevers or infections.    No family history of strokes at a young age.    TIA Evaluation Summarized    MRI and/or Head CT MRI: no acute findings   Intracranial Vasculature CTA head: multifocal ICAD involving R RANDAL, R suprclinoid ICA, L MCA, L PCA   Cervical Vasculature CTA neck: no significant stenosis     Echocardiogram EF 60-65%, normal LA size, " "PFO present with moderately + bubble study, no regional WMAs   EKG/Telemetry Sinus rhythm   Other Testing Not Applicable      LDL 9/8/2023: 88 mg/dL   A1C 9/7/2023: 5.7 %       ABCD2 Patients Score   Age ? 60 years 1 point 1   Blood Pressure    SBP ? 140 or DBP ?  90    1 point 1   Clinical Features    - Unilateral weakness    - Speech disturbance w/o weakness    - Other    2 points  1 point    0 points 0   Duration of symptoms    ? 60 minutes    10-59 minutes    < 10 minutes   2 points  1 point  0 points 2   Diabetes  1 point 0   Patient s ABCD2 Score (0-7) = 4       Impression  Episode of difficulty using both arms but (may have been more so the R but unclear), difficulty lifting/coordinating RLE movement, balance issue, feeling \"off\"/slower to respond and followed by some jerking/twitching movements of the RUE/RLE - etiology uncertain. History is not typical for TIA but the R sided localization does raise the possibility of TIA, especially given findings of intracranial athero and no other clear explanation for symptoms at this time. Would treat as TIA but also consider alternative causes including seizure vs other.  2.   Multifocal intracranial athero - unclear etiology given apparent lack of significant cerebrovascular risk factors. No history suggestive of RCVS, consider atherosclerotic etiology vs infectious or inflammatory vasculopathy.  3.   PFO - unclear if related to current symptoms, if this was a TIA then ROPE score is 4.        Recommendations   - Neurochecks and Vital Signs every 4 hours   - DAPT x90 days with aspirin 325 mg + Plavix 75 mg followed by aspirin 325 mg daily  - Statin: atorvastatin 40 mg, titrate to LDL goal 40-70  - 24-hour Telemetry  - Discharge with 30 day cardiac monitor (ordered)  - Bedside Glucose Monitoring  - Hgb A1c consistent with diagnosis of pre-DM, management per primary team  - Nutrition: per nursing  - Rehab (PT/OT/SLP) services NOT required due to lack of ongoing " "deficit  - Stroke Education   - Euthermia, Euglycemia  - EEG ordered - if unable to get done today then can be done as an outpatient      Patient Follow-up    - in 8-10 weeks with any stroke APR[facility] ([phone]) - if unable to get scheduled in APR[facility] clinic within that time frame then ok for general neurology follow up (ordered)  - MRI/A 7T with vessel wall imaging, ideally within the next week to further evaluate vasculopathy (ordered)      Thank you for this consult. No further stroke evaluation is recommended, so we will sign off. Please contact us with any additional questions.    Elisabeth Rosas PA-C  Vascular Neurology    To page me or covering stroke neurology team member, click here: AMCOM  Choose \"On Call\" tab at top, then select \"NEUROLOGY/ALL SITES\" from middle drop-down box, press Enter, then look for \"stroke\" or \"telestroke\" for your site.  _____________________________________________________    Clinically Significant Risk Factors Present on Admission           # Hypercalcemia: Highest Ca = 10.5 mg/dL in last 2 days, will monitor as appropriate             # Overweight: Estimated body mass index is 25.68 kg/m  as calculated from the following:    Height as of this encounter: 1.778 m (5' 10\").    Weight as of this encounter: 81.2 kg (179 lb).              Past Medical History    No past medical history on file.  Medications   Home Meds  Prior to Admission medications    Medication Sig Start Date End Date Taking? Authorizing Provider   multivitamin w/minerals (THERA-VIT-M) tablet Take 1 tablet by mouth daily   Yes Unknown, Entered By History   Omega-3 Fatty Acids (FISH OIL) 1200 MG capsule Take 1,200 mg by mouth daily   Yes Unknown, Entered By History       Scheduled Meds   aspirin  325 mg Oral Daily    sodium chloride (PF)  3 mL Intracatheter Q8H       Infusion Meds   - MEDICATION INSTRUCTIONS -         Allergies   No Known Allergies       PHYSICAL EXAMINATION   Temp:  [97.9  F (36.6  C)-98.3  F (36.8  C)] 98  F " (36.7  C)  Pulse:  [51-77] 77  Resp:  [14-26] 19  BP: (101-143)/(60-84) 140/83  SpO2:  [93 %-98 %] 97 %    General Exam  General:  patient lying in bed without any acute distress    HEENT:  normocephalic/atraumatic  Pulmonary:  no respiratory distress    Neuro Exam  Mental Status:  alert, oriented x 3, follows commands, speech clear and fluent, naming and repetition normal  Cranial Nerves:  visual fields intact (tested by nurse), EOMI with normal smooth pursuit, facial sensation intact and symmetric (tested by nurse), facial movements symmetric, hearing not formally tested but intact to conversation, no dysarthria, tongue protrusion midline  Motor:  no abnormal movements, able to move all limbs antigravity spontaneously with no signs of hemiparesis observed, no pronator drift   Reflexes:  unable to test (telestroke)  Sensory:  light touch sensation intact and symmetric throughout upper and lower extremities (assessed by nurse), no extinction on double simultaneous stimulation (assessed by nurse)  Coordination:  normal finger-to-nose and heel-to-shin bilaterally without dysmetria, rapid alternating movements symmetric  Station/Gait:  unable to test due to telestroke    Stroke Scales    NIHSS  1a. Level of Consciousness 0-->Alert, keenly responsive   1b. LOC Questions 0-->Answers both questions correctly   1c. LOC Commands 0-->Performs both tasks correctly   2.   Best Gaze 0-->Normal   3.   Visual 0-->No visual loss   4.   Facial Palsy 0-->Normal symmetrical movements   5a. Motor Arm, Left 0-->No drift, limb holds 90 (or 45) degrees for full 10 secs   5b. Motor Arm, Right 0-->No drift, limb holds 90 (or 45) degrees for full 10 secs   6a. Motor Leg, Left 0-->No drift, leg holds 30 degree position for full 5 secs   6b. Motor Leg, right 0-->No drift, leg holds 30 degree position for full 5 secs   7.   Limb Ataxia 0-->Absent   8.   Sensory 0-->Normal, no sensory loss   9.   Best Language 0-->No aphasia, normal   10.  Dysarthria 0-->Normal   11. Extinction and Inattention  0-->No abnormality   Total 0 (09/08/23 1130)       Imaging  I personally reviewed all imaging; relevant findings per HPI.    Labs Data   CBC  Recent Labs   Lab 09/08/23  0556 09/07/23  1022   WBC 6.7 7.8   RBC 4.62 4.67   HGB 13.1* 12.9*   HCT 39.6* 40.3    185     Basic Metabolic Panel   Recent Labs   Lab 09/08/23  0556 09/08/23  0301 09/07/23  1024 09/07/23  1022     --   --  135*   POTASSIUM 4.5  --   --  4.4   CHLORIDE 105  --   --  104   CO2 24  --   --  23   BUN 21.4  --   --  24.1*   CR 0.98  --   --  1.04   GLC 94 95 92 91   ADITYA 9.7  --   --  10.5*     Liver Panel  No results for input(s): PROTTOTAL, ALBUMIN, BILITOTAL, ALKPHOS, AST, ALT, BILIDIRECT in the last 168 hours.  INR  No lab results found.        Stroke Consult Data Data   Telestroke Service Details  (for non-emergent stroke consult with tele)  Video start time 09/08/23   1128   Video end time 09/08/23   1220   Type of service telemedicine diagnostic assessment of acute neurological changes   Reason telemedicine is appropriate patient requires assessment with a specialist for diagnosis and treatment of neurological symptoms   Mode of transmission secure interactive audio and video communication per Shahriar   Originating site (patient location) [facility]    Distant site (provider location) [facility]       I have personally spent a total of 90 minutes providing care today, time spent in reviewing medical records and reviewing tests, examining the patient and obtaining history, coordination of care, and discussion with the patient and/or family regarding diagnostic results, prognosis, symptom management, risks and benefits of management options, and development of plan of care. Greater than 50% was spent in counseling and coordination of care.

## 2023-09-08 NOTE — PLAN OF CARE
PRIMARY DIAGNOSIS: TIA  OUTPATIENT/OBSERVATION GOALS TO BE MET BEFORE DISCHARGE:  ADLs back to baseline: Yes    Activity and level of assistance: Ambulating independently.    Pain status: Pain free.    Return to near baseline physical activity: Yes     Discharge Planner Nurse   Safe discharge environment identified: Yes  Barriers to discharge: No       Entered by: Ruthie Castelan RN 09/08/2023     Pt is A&Ox4. VSS on RA. Family at bedside.     Please review provider order for any additional goals.   Nurse to notify provider when observation goals have been met and patient is ready for discharge.

## 2023-09-08 NOTE — CARE PLAN
Patient's After Visit Summary was reviewed with patient.  Patient verbalized understanding of After Visit Summary, recommended follow up and was given an opportunity to ask questions.   Discharge medications sent home with patient/family:   Discharged with spouse

## 2023-09-08 NOTE — PLAN OF CARE
"PRIMARY DIAGNOSIS: TIA  OUTPATIENT/OBSERVATION GOALS TO BE MET BEFORE DISCHARGE:  ADLs back to baseline: Yes    Activity and level of assistance: Ambulating independently.    Pain status: Pain free.    Return to near baseline physical activity: Yes     Discharge Planner Nurse   Safe discharge environment identified: Yes  Barriers to discharge: No       Entered by: Ruthie Castelan RN 09/08/2023     /78 (BP Location: Right arm)   Pulse 51   Temp 98  F (36.7  C) (Oral)   Resp 16   Ht 1.778 m (5' 10\")   Wt 81.2 kg (179 lb)   SpO2 96%   BMI 25.68 kg/m      Pt is A&Ox4. VSS on RA. Regular diet. Pt is independent in room. Pt up walking halls with SBA. Tele: SR 53. Consults: Neurology    Please review provider order for any additional goals.   Nurse to notify provider when observation goals have been met and patient is ready for discharge.  "

## 2023-09-08 NOTE — PLAN OF CARE
Speech Language Pathology: Orders received. Chart reviewed and discussed with care team.? Speech Language Pathology not indicated due to pt passed RN swallow screen. Spoke with pt's family and pt. They deny changes with cognition, speech, or swallowing and report he is at baseline. No further SLP services indicated.? Defer discharge recommendations to MD/PT/OT.? Will complete orders.

## 2023-09-08 NOTE — CONSULTS
09/08/23 1311 Klisch, Christine M, RN   Stroke Education Note     The following information has been reviewed with the patient and family:     1. Warning signs of stroke     2. Calling 911 if having warning signs of stroke     3. All modifiable risk factors: hypertension, CAD, atrial fib, diabetes, hypercholesterolemia, smoking, substance abuse, diet, physical inactivity, obesity, sleep apnea.     4. Patient's risk factors for stroke which include: none     5. Follow-up plan for after discharge     6. Discharge medications which include:aspirin,plavix,lipitor     In addition, the above information was given to the patient and family in writing as a part of the [facility] Stroke Class Handout.     Learner's response to risk factors / lifestyle modification education: Committment to change Taking steps      Christine M Klisch, RN

## 2023-09-08 NOTE — DISCHARGE SUMMARY
"[facility]  Hospitalist Discharge Summary      Date of Admission:  9/7/2023  Date of Discharge:  9/8/2023  Discharging Provider: SAHRA Hodges CNP  Discharge Service: Hospitalist Service    Discharge Diagnoses   See below    Clinically Significant Risk Factors     # Overweight: Estimated body mass index is 25.68 kg/m  as calculated from the following:    Height as of this encounter: 1.778 m (5' 10\").    Weight as of this encounter: 81.2 kg (179 lb).       Follow-ups Needed After Discharge   Ongoing TIA work up in the setting of atypical neurologic symptoms but known cerebral artery stenosis and now known PFO.  Started on statin and Plavix/ASA x 90 days then ASA only.  Will need MRI as OP at the [facility].  EEG done at time of discharge with results pending.  Referral placed for interventional cardiology to discuss closure of PFO.      Unresulted Labs Ordered in the Past 30 Days of this Admission       No orders found for last 31 day(s).        These results will be followed up by NA    Discharge Disposition   Discharged to home  Condition at discharge: Stable    Hospital Course   Derek Abbasi is a 58 year old male with a past medical history of prostate cancer (diagnosed 4 - 5 years ago, in active surveillance program, no current treatment), who was admitted on 9/7/2023 after presenting for evaluation of atypical neurological symptoms of disorientation, global weakness, coordination impairment.      Atypical Neurologic Symptoms   Admitted obs for TIA work up although symptoms not classic for TIA. Episode of incoordination, global weakness bilateral LE, possible involuntary twitching.  Associated lightheadedness, no chest pain, syncope or dyspnea.  Occurred after physical exertion with push ups.  Symptoms resolved after about an hour, evaluated in the ED code stroke negative.  Vascular neurology was consulted given dual antiplatelet therapy admitted observation.  Placed on cardiac " telemetry.  Echocardiogram done (see below).  -  Both Brain MRI and CT Head w/o contrast (9/7) showed no acute intracranial process  -  EEG pending.   -  Discharge with 30 day cardiac monitor  -  Started on  mg & Plavix 75 mg daily regimen for 90 days (DAPT), followed by 325 mg daily  -  Stroke education provided to patient and spouse  -  Hospital follow up with PCP in the next 1 - 2 weeks.  Neurology follow up in 8-10 weeks. Cardiology as outpatient for PFO evaluation.     Cerebral Artery Stenosis  Noted on CTA, no large vessel occlusion.  Unclear if related to presenting symptoms.  Moderate to severe focal stenosis origin of right RANDAL, right ICA. Mild to moderate focal stenosis of the mid to distal M1 segment of the left middle cerebral artery. Mild stenoses in the P2 segment of the left posterior cerebral artery.  -  Discussed importance of maintaining physical activity but adjusting exertion to a lower level to allow for adequate perfusion  -  DAPT for 90 days then ASA alone along with PCP follow up as stated above    #Patent Foramen Ovale (New Diagnosis)  Echocardiogram (9/8) shows EF of 60-65%, bubble study moderately positive - PFO present, no RWMA  - Follow up with Cardiology for possible procedural intervention.    #Pre-diabetes  Hgb A1c of 5.7% (9/7) is within range considered pre-diabetic.  Patient and spouse informed of this.  -  Continue consistent and well-tolerated physical activity with attention to well-balanced diet  -  Follow up with PCP for management. Referred to diabetic educator as outpatient.     See AVS for more information.      Consultations This Hospital Stay   PATIENT LEARNING CENTER IP CONSULT  NEUROLOGY IP STROKE CONSULT  PHARMACY IP CONSULT  CARE MANAGEMENT / SOCIAL WORK IP CONSULT  PHARMACY IP CONSULT  SPEECH LANGUAGE PATH ADULT IP CONSULT  PHARMACY IP CONSULT  SMOKING CESSATION PROGRAM IP CONSULT    Code Status   Full Code    Time Spent on this Encounter   Omar JIN  SAHRA Goetz CNP, personally saw the patient today and spent greater than 30 minutes discharging this patient.       SAHRA Hodges CNP  [facility] OBSERVATION DEPT  [address]  [facility] [address]  Phone: [phone]  ______________________________________________________________________    Physical Exam   Vital Signs: Temp: 98.3  F (36.8  C) Temp src: Oral BP: 134/71 Pulse: 67   Resp: 18 SpO2: 96 % O2 Device: None (Room air)    Weight: 179 lbs 0 oz  General Appearance: Alert & attentive, interactive & pleasant, sitting up in bed, NAD  Respiratory: Regular rate and rhythm of breathing, non-labored, CTA bilaterally  Cardiovascular: RRR, normal S1 & S2 without RMCG, PT pulses 2+ bilaterally  GI: Normoactive bowel sounds, abdomen soft and non-tender  Skin: Warm and dry with no lower extremity edema, redness, or tenderness to palpation  Neuro: A&Ox3, no facial asymmetry, speech is clear and coherent, CN II-XII intact, strength and sensation to light touch intact to upper and lower extremities, cerebellar testing normal        Primary Care Physician   [facility]    Discharge Orders      MR Brain w/o & w Contrast     MRA Brain ([place]) wo Contrast     Follow-Up with Cardiology      Diabetes Educator Referral      Reason for your hospital stay    TIA  PFO  Cerebral artery stenosis  Pre diabetes     Follow-up and recommended labs and tests     Follow up with primary care provider, [facility], within 7 days for hospital follow- up.  No follow up labs or test are needed.    Follow up with Cardiology -- referral placed to discuss closure of PFO.  Follow up with Neurology -- referral placed for follow up evaluation after TIA.     Activity    Your activity upon discharge: activity as tolerated     When to contact your care team    Call your primary doctor if you have any of the following: any questions or concerns.     Discharge Instructions    Make sure  you are staying hydrated.  Aim is to drink a minimum of 60-90 ounces of non carbonated, non caffeinated beverages daily.      -----------------------------------    If you or a loved one is in a life-threatening situation, please call 911 or go to the nearest emergency room. In non-emergency situations, you can call the following alcohol hotlines for information and support:    Aftercare Plan     [facility] Phone (free remote counseling): [phone]. Web address:   [email or web address]      If I am feeling unsafe or I am in a crisis, I will:   Contact my established care providers   Call the National Suicide Prevention Lifeline: 803.989.2138   Go to the nearest emergency room   Call 911      Warning signs that I or other people might notice when a crisis is developing for me:     I am having increasing suicidal thoughts that turn to plans with intent or means   I am having additional urges to self-harm    My emotions are of hopelessness; feeling like there's no way out.  Rage or anger.  Engaging in risky activities without thinking  Withdrawing from family/friends  Dramatic mood swings  Drastic personality changes   Use of alcohol or drugs  Postings on social media  Neglect of personal hygiene or cares      Things I am able to do on my own to cope or help me feel better:    Spending quality time with loved ones  Staying hydrated  Eating balanced meals  Going for a walk every day  Take care of daily responsibilities/needs  Focus on positive self-talk vs negative self-talk     Things that I am able to do with others to cope or help me better:   Exercise  Music  Deep breathing  Meditations  Journal  Self-regulate  Self check-in  Ask for help     Things I can use or do for distraction:   Reach out to/spend time with family, friends  Shower  Exercise  Chores or do a project  Listen to music  Watch movie/TV  Listening to music  Journaling  Reading a book  Meditating  Call a friend     Changes I can make to  support my mental health and wellness:    -I will abstain from all mood altering chemicals not currently prescribed to me    -I will attend scheduled mental health therapy and psychiatric appointments and follow all   recommendations  -I will commit to 30 minutes of self care daily - this can be as simple as taking a shower, going for a   walk, cooking a meal, read, writing, etc  -I will practice square breathing when I begin to feel anxious - in breath through the nose for the count   of 4 and the first line on the square. Out breath through the mouth for the count of 4 for the second line   of the square. Repeat to complete the square. Repeat the square as many times as needed.  - I will use distraction skills of: going for walks, watching TV, spending time outside, calling a friend or   family member  -Use community resources, including hotline numbers, [facility] crisis and support meetings  -Maintain a daily schedule/routine  -Practice deep breathing skills  -Download a meditation tere and spend 15-20 minutes per day mediating/relaxing. Some apps to   download include: Calm, Headspace and Insight Timer. All 3 of these apps have free version     Reduce Extreme Emotion  QUICKLY:  Changing Your Body Chemistry      T:  Change your body Temperature to change your autonomic nervous system   Use Ice Water to calm yourself down FAST   Put your face in a bowl of ice water (this is the best way; have the person keep his/her face in ice water for 30-45 seconds - initial research is showing that the longer s/he can hold her/his face in the water, the better the response), or   Splash ice water on your face, or hold an ice pack on your face      I:  Intensely exercise to calm down a body revved up by emotion   Examples: running, walking fast, jumping, playing basketball, weight lifting, swimming, calisthenics, etc.   Engage in exercises that DO NOT include violent behaviors. Exercises that utilize violent behaviors tend to  "function as \"behavioral rehearsal,\" and rather than calming the person down, may actually \"rev\" the person up more, increasing the likelihood of violence, and lessening the likelihood that they will \"burn off\" energy     P:  Progressively relax your muscles   Starting with your hands, moving to your forearms, upper arms, shoulders, neck, forehead, eyes, cheeks and lips, tongue and teeth, chest, upper back, stomach, buttocks, thighs, calves, ankles, feet   Tense (10 seconds,   of the way), then relax each muscle (all the way)   Notice the tension   Notice the difference when relaxed (by tensing first, and then relaxing, you are able to get a more thorough relaxation than by simply relaxing)      P: Paced breathing to relax   The standard technique is to begin with counting the number of steps one takes for a typical inhale, then counting the steps one takes for a typical exhale, and then lengthening the amount of steps for the exhalation by one or two steps.  OR  Repeat this pattern for 1-2 minutes  Inhale for four (4) seconds   Exhale for six (6) to eight (8) seconds   Research demonstrated that one can change one's overall level of anxiety by doing this exercise for even a few minutes per day       People in my life that I can ask for help:   Family  Friends  Providers     Your county has a mental health crisis team you can call 24/7:   [facility] Crisis Line Number: [phone]  [facility] Crisis: [phone] - Call the crisis line for immediate mental health support, 24 hours a day.   [facility] Crisis Line Number: [phone]  [facility] Crisis Line Number: [phone]  [facility] Crisis Line Number: [phone]   [facility] Crisis Line Number: [phone]  [place]: [phone]  [place]: [phone]  [facility] Crisis Number: [phone]  [facility] Crisis: [phone]     Other things that are important when I'm in " "crisis:   Ask for help     Additional resources and information:      Mental Health Apps  My3  https://kontakt.io.org/     VirtualHopeBox  [email or web address]://[email or web address]/virtual-hope-box/        Professionals or Agencies I Can Contact During A Crisis:        Crisis Lines  Call or Text 685 - National Suicide and Crisis Lifeline     Crisis Text Line  Text 118575  You will be connected with a trained live crisis counselor to provide support.     The Ko Project (LGBTQ Youth Crisis Line)  0.538.326.7273  text START to 897-794     National Mitchell on Mental Illness (MORGAN)  642.428.8436 or 4.697.MORGAN.HELPS     National Suicide Prevention Lifeline at 2-298-261-RPGK (8327)      Throughout  [place]: call **CRISIS (**411718)      Crisis Text Line: is available for free, 24/7 by texting [place] to 812051     [employer]  Fast Tracker  Linking people to mental health and substance use disorder resources  LikeAndy.org      [place] Mental Health Warm Line  Peer to peer support  Monday thru Saturday, 12 pm to 10 pm  340.792.9798 or 3.040.149.4061  Text \"Support\" to 77385     National Mitchell on Mental Illness (www.[place].morgan.org): 139.754.7567 or 602-187-7047     [facility] Phone (free remote counseling): [phone] Web address:   https://Virtual Solutions.org/      www.[employer] Systems (filter for insurance, gender preference, etc.)     [facility]   [phone]  Intake appointment will be virtual, following appointments can be in person or virtual.   **IMMEDIATE OPENINGS**     [facility]  [phone]  *offers individual therapy, medication management and Mental Health Case Workers; can self refer     [facility]  [phone]  *Immediate Openings     [facility]  [phone]  *Immediate Openings     [facility]  [phone]  *Immediate Openings     Please follow up with scheduled providers to ensure all necessary " paperwork is filled out prior to your   scheduled telehealth appointments.      Coordinators from [facility] will be calling within two business days to ensure   that you have the resources you may need or provide assistance with scheduling (Phone number: [phone].).     Remember: give the referrals 3 sessions prior to calling it quits. Do you trust them? Do you feel   understood? Do you think they can help? Check in with yourself after each session       [place] [facility]: (282) 929-2925?    The [employer] Drug Helpline is a 24/7 alcohol helpline where you can get information about alcohol use disorder and alcohol rehab. This hotline operates around the clock, but your call may occasionally go unanswered due to high call volumes or staff shortage. Please call back another time or call one of the other hotlines listed below.    Alcoholics Anonymous 1-996.562.7457    Find out more about the AA program or fellowship by calling the Alcoholics Anonymous number.    Alcohol Hotline for IAFF Members 1-910.148.2118    The alcohol hotline is in place to support firefighters and paramedics who are having issues with alcohol. It is for IAFF members and their family and friends.    [facility]: 1-968-419-HELP (3362)    The [facility] (Substance Abuse and Mental Health Services Administration) helpline is a U.S. government initiative. This hotline gives you access to nationwide resources for alcohol use disorder treatment, including information and referrals to rehabs near you. The [facility] helpline is available 24/7 and offers services in both English and German.    National Suicide Prevention Lifeline: 7-677-058-TALK (2751)    This is a national helpline for people in emotional distress with suicidal thoughts. Calls to the National Suicide Prevention Lifeline are free and confidential. The hotline operates 24/7.    National Poison Control: 1-628.934.8389    The U.S. Poison Control helpline provides  information about the prevention and treatment of drug overdoses, including alcohol poisoning.     Diet    Follow this diet upon discharge: Orders Placed This Encounter      Regular Diet Adult    Mediterranean diet is the best diet.     Stroke Hospital Follow Up (for neurologist use only)    [employer] will call you to coordinate care as prescribed by your provider. If you don t hear from a representative within 2 business days, please call [phone].         Significant Results and Procedures   Most Recent 3 CBC's:  Recent Labs   Lab Test 09/08/23  0556 09/07/23  1022   WBC 6.7 7.8   HGB 13.1* 12.9*   MCV 86 86    185     Most Recent 3 BMP's:  Recent Labs   Lab Test 09/08/23  0556 09/08/23  0301 09/07/23  1024 09/07/23  1022     --   --  135*   POTASSIUM 4.5  --   --  4.4   CHLORIDE 105  --   --  104   CO2 24  --   --  23   BUN 21.4  --   --  24.1*   CR 0.98  --   --  1.04   ANIONGAP 7  --   --  8   ADITYA 9.7  --   --  10.5*   GLC 94 95 92 91     Most Recent Cholesterol Panel:  Recent Labs   Lab Test 09/08/23  0556   CHOL 139   LDL 88   HDL 40   TRIG 55     Most Recent Hemoglobin A1c:  Recent Labs   Lab Test 09/07/23  1022   A1C 5.7*   ,   Results for orders placed or performed during the hospital encounter of 09/07/23   Head CT w/o contrast    Narrative    CT SCAN OF THE HEAD WITHOUT CONTRAST   9/7/2023 11:26 AM     HISTORY: Episode of generalized weakness, balance issues and trouble  brushing teeth one hour prior to arrival. Altered mental status.    TECHNIQUE:  Axial images of the head and coronal reformations without  IV contrast material. Radiation dose for this scan was reduced using  automated exposure control, adjustment of the mA and/or kV according  to patient size, or iterative reconstruction technique.    COMPARISON: None.    FINDINGS: There is no evidence of intracranial hemorrhage, mass, acute  infarct or anomaly. The ventricles are normal in size and  configuration. There is  mild generalized brain parenchymal volume  loss. Brain parenchymal attenuation appears within normal limits for  age.     The visualized portions of the sinuses and mastoids appear normal. The  bony calvarium and bones of the skull base appear intact.       Impression    IMPRESSION: No CT findings of acute intracranial process.    GUERRERO GARSIA MD         SYSTEM ID:  [id number]   CTA Head Neck with Contrast    Narrative    CT ANGIOGRAM OF THE HEAD AND NECK WITH CONTRAST  9/7/2023 11:26 AM     HISTORY: Episode of generalized weakness, balance issues and trouble  brushing teeth 1 hr prior to arrival. Altered mental status.    TECHNIQUE: CT angiography with an injection of 67mL Isovue-370 IV with  scans through the head and neck. Images were transferred to a separate  3-D workstation where multiplanar reformations and 3-D images were  created. Estimates of carotid stenoses are made relative to the distal  internal carotid artery diameters except as noted. Radiation dose for  this scan was reduced using automated exposure control, adjustment of  the mA and/or kV according to patient size, or iterative  reconstruction technique.    COMPARISON: CT head same day.     CT ANGIOGRAM HEAD FINDINGS: There is a moderate to severe focal  stenosis of the origin of the A1 segment of the right anterior  cerebral artery (series 9 image 45). Moderate appearing focal stenosis  of the supraclinoid segment of the right internal carotid artery just  distal to the origin of the right anterior cerebral artery. There is  mild to moderate short segment stenosis of the mid to distal M1  segment of the left middle cerebral artery (series 8 image 42). The  bilateral vertebral arteries and the basilar artery are patent. There  is a fetal origin of the right posterior cerebral artery from the  anterior circulation, a normal variant. Mild stenoses in the P2  segment of the left posterior cerebral artery are noted (for example,  see series 9 image  57). No other evidence for high-grade proximal  arterial stenosis/occlusion involving the major branches of the  anterior cerebral or middle cerebral arteries is identified. No  intracranial aneurysm or high flow vascular malformation is  identified. There is expected enhancement of the major dural venous  sinuses.    CT ANGIOGRAM NECK FINDINGS:   Normal origin of the great vessels from the aortic arch.     Right carotid artery: The right common and internal carotid arteries  are patent. No significant stenosis or atherosclerotic disease in the  carotid artery.     Left carotid artery: The left common and internal carotid arteries are  patent. No significant stenosis or atherosclerotic disease in the  carotid artery.     Vertebral arteries: Vertebral arteries are patent without evidence of  dissection. No significant stenosis.     Other findings: Scattered mildly prominent bilateral cervical lymph  nodes are nonspecific, but may be reactive in nature. Multilevel  degenerative changes in the cervical spine.      Impression    IMPRESSION:  1. No large vessel occlusion identified intracranially.  2. Moderate to severe focal stenosis at the origin of the right  anterior cerebral artery. Moderate focal stenosis of the adjacent  supraclinoid right internal carotid artery.  3. Mild to moderate focal stenosis of the mid to distal M1 segment of  the left middle cerebral artery. Mild stenoses in the P2 segment of  the left posterior cerebral artery.  4. No intracranial aneurysm or high flow vascular formation.  5. Patent cervical carotid and vertebral arteries without significant  stenosis.    GUERRERO GARSIA MD         SYSTEM ID:  [id number]   MR Brain w/o & w Contrast    Narrative    MRI BRAIN WITHOUT AND WITH CONTRAST  9/7/2023 12:46 PM     HISTORY: Feeling off, twitchy, episode of weakness, trouble brushing  teeth/ambulating.     TECHNIQUE: Multiplanar, multisequence MRI of the brain without and  with 8 mL Gadavist.      COMPARISON: CT of the head 2023.     FINDINGS: No definite abnormal intracranial restricted diffusion to  suggest recent infarct. The ventricles are normal in size and  configuration. Normal morphology, volume, and signal intensity of the  brain parenchyma for the patient's age. No intracranial hemorrhage  identified. No extra axial fluid collection or mass effect. No  intracranial mass or abnormal enhancement identified. The major  arterial flow voids of the skull base appear to be grossly maintained.    Orbits appear unremarkable, accounting for technique. Mild mucosal  thickening in the ethmoid sinuses. Trace fluid/membrane thickening in  the mastoid air cells. Small ovoid nonspecific T2 hyperintense  enhancing lesion in the posterior aspect of the superficial lobe of  the right parotid gland, potentially representing a lymph node. The  calvarium, skull base, and midface otherwise appear unremarkable.      Impression    IMPRESSION: Unremarkable brain MRI for age. No acute intracranial  process.    GUERRERO GARSIA MD         SYSTEM ID:  [id number]   Echocardiogram Complete with Bubble Study     Value    LVEF  60-65%    Narrative    [id number]  [id number]  [id number]  [id number]^INES^AGGIE^FRANCISCO     [facility]  Echocardiography Laboratory  [address] [place] [address]     Name: ILEANA BAPTISTE  MRN: [id number]  : 1965  Study Date: 2023 10:30 AM  Age: 58 yrs  Gender: Male  Patient Location: [facility]  Reason For Study: TIA  Ordering Physician: AGGIE CHACON  Performed By: Madelyn Alonzo     BSA: 2.0 m2  Height: 70 in  Weight: 179 lb  HR: 55  BP: 137/79 mmHg  ______________________________________________________________________________  Procedure  Complete Portable Bubble Echo Adult.  ______________________________________________________________________________  Interpretation Summary     Left ventricular systolic function is normal.  The visual ejection fraction  is 60-65%.  No regional wall motion abnormalities noted.  A contrast injection (Bubble Study) was performed that was moderately positive  for flow across the interatrial septum.  A patent foramen ovale is present.  The study was technically adequate. There is no comparison study available.  ______________________________________________________________________________  Left Ventricle  The left ventricle is normal in size. There is borderline concentric left  ventricular hypertrophy. Left ventricular systolic function is normal. The  visual ejection fraction is 60-65%. Grade I or early diastolic dysfunction. No  regional wall motion abnormalities noted.     Right Ventricle  The right ventricle is normal size. The right ventricular systolic function is  normal.     Atria  Normal left atrial size. Right atrial size is normal. A Valsalva maneuver was  performed. A patent foramen ovale is present. A contrast injection (Bubble  Study) was performed that was moderately positive for flow across the  interatrial septum.     Mitral Valve  The mitral valve leaflets are mildly thickened. There is trace mitral  regurgitation.     Tricuspid Valve  There is trace tricuspid regurgitation. The right ventricular systolic  pressure is approximated at 15.2 mmHg plus the right atrial pressure.     Aortic Valve  There is mild trileaflet aortic sclerosis. No aortic stenosis is present.     Pulmonic Valve  The pulmonic valve is not well visualized.     Vessels  The aortic root is normal size.     Pericardium  There is no pericardial effusion.     Rhythm  Sinus rhythm was noted.  ______________________________________________________________________________  MMode/2D Measurements & Calculations     IVSd: 1.1 cm  LVIDd: 5.0 cm  LVIDs: 3.3 cm  LVPWd: 1.1 cm  IVC diam: 1.7 cm  FS: 33.9 %  LV mass(C)d: 205.2 grams  LV mass(C)dI: 103.1 grams/m2  Ao root diam: 3.8 cm  LA dimension: 4.0 cm  asc Aorta Diam: 3.7 cm  LA/Ao: 1.0  LVOT diam: 2.1  cm  LVOT area: 3.4 cm2  Ao root diam Index (cm/m2): 1.9  asc Aorta Diam Index (cm/m2): 1.9  LA Volume (BP): 64.3 ml     LA Volume Index (BP): 32.3 ml/m2  RV Base: 4.1 cm  RWT: 0.42  TAPSE: 2.2 cm     Doppler Measurements & Calculations  MV E max jan: 50.3 cm/sec  MV A max jan: 72.3 cm/sec  MV E/A: 0.70  MV max P.7 mmHg  MV mean P.75 mmHg  MV V2 VTI: 24.6 cm  MVA(VTI): 3.4 cm2  MV P1/2t max jan: 57.8 cm/sec  MV P1/2t: 101.4 msec  MVA(P1/2t): 2.2 cm2  MV dec slope: 166.8 cm/sec2  MV dec time: 0.39 sec  Ao V2 max: 114.0 cm/sec  Ao max P.0 mmHg  Ao V2 mean: 75.6 cm/sec  Ao mean PG: 3.0 mmHg  Ao V2 VTI: 24.1 cm  CAMREN(I,D): 3.5 cm2  CARMEN(V,D): 3.4 cm2  LV V1 max P.4 mmHg  LV V1 max: 116.0 cm/sec  LV V1 VTI: 24.9 cm  SV(LVOT): 83.7 ml  SI(LVOT): 42.0 ml/m2  PA V2 max: 102.7 cm/sec  PA max P.2 mmHg  PA mean P.3 mmHg  PA V2 VTI: 22.9 cm  PA acc time: 0.14 sec  TR max jan: 194.7 cm/sec  TR max PG: 15.2 mmHg  AV Jan Ratio (DI): 1.0  CARMEN Index (cm2/m2): 1.7     E/E' av.6  Lateral E/e': 6.4  Medial E/e': 12.8  RV S Jan: 17.6 cm/sec     ______________________________________________________________________________  Report approved by: Yumiko Stephenson 2023 11:48 AM             Discharge Medications   Discharge Medication List as of 2023  3:49 PM        START taking these medications    Details   acetaminophen (TYLENOL) 325 MG tablet Take 2 tablets (650 mg) by mouth every 4 hours as needed for mild pain or fever (temperatures greater than 100.4  F (38  C)), OTC      aspirin (ASA) 325 MG EC tablet Take 1 tablet (325 mg) by mouth daily, Disp-30 tablet, R-PRN, E-Prescribe      atorvastatin (LIPITOR) 40 MG tablet Take 1 tablet (40 mg) by mouth every evening, Disp-30 tablet, R-3, E-Prescribe      clopidogrel (PLAVIX) 75 MG tablet Take 1 tablet (75 mg) by mouth daily, Disp-30 tablet, R-3, E-PrescribeASA and Plavix for 90 days then ASA thereafter           CONTINUE these medications which have NOT  CHANGED    Details   multivitamin w/minerals (THERA-VIT-M) tablet Take 1 tablet by mouth daily, Historical      Omega-3 Fatty Acids (FISH OIL) 1200 MG capsule Take 1,200 mg by mouth daily, Historical           Allergies   No Known Allergies

## 2023-09-11 ENCOUNTER — TELEPHONE (OUTPATIENT)
Dept: NEUROLOGY | Facility: CLINIC | Age: 58
End: 2023-09-11
Payer: COMMERCIAL

## 2023-09-11 NOTE — TELEPHONE ENCOUNTER
This encounter is being sent to inform the clinic that this patient has a referral from Elisabeth Rosas PA-C for the diagnoses of TIA (transient ischemic attack)  Intracranial atherosclerosis  and has requested that this patient be seen within 8-10 weeks and/or with Roselyn Rosas or any APRIL.  Based on the availability of our provider(s), we are unable to accommodate this request.    Were all sites offered this patient?  APPs's schedules not available. Please review and contact the patient to schedule.

## 2023-09-22 ENCOUNTER — TELEPHONE (OUTPATIENT)
Dept: NEUROLOGY | Facility: CLINIC | Age: 58
End: 2023-09-22

## 2023-09-22 NOTE — TELEPHONE ENCOUNTER
Health Call Center    Phone Message    May a detailed message be left on voicemail: yes     Reason for Call: Order(s): Other:   Reason for requested: MR & MRA  Date needed:   Provider name: SHORTY Rosas    Pt states MRI declined by insurance and is requesting more information such as the procedure codes...     Please follow up with patient.    Phone number to reach patient:  Cell number on file:    Telephone Information:   Mobile [phone]       Action Taken: Message routed to:  Neurology    Travel Screening: Not Applicable    Mynor Santillan on 9/22/2023 at 3:34 PM   - Neurology

## 2023-09-25 NOTE — TELEPHONE ENCOUNTER
P2P message has been sent to Dr. Frances to complete in hopes of gaining insurance approval.     Alice, can you please call the patient and just let him know that Dr. Frances is going to try to get it approved?      Lori Dolan BS, RN, SCRN  RN Stroke Neurology Care Coordinator  [facility] Neuroscience Service Line

## 2023-09-26 NOTE — TELEPHONE ENCOUNTER
Routing to Dr. Frances to please call for PA of MRI.      Lori Dolan BS, RN, SCRN  RN Stroke Neurology Care Coordinator  [facility] Neuroscience Service Line

## 2023-09-26 NOTE — TELEPHONE ENCOUNTER
Stroke RN Care Coordination - Unable to Reach / Voicemail Note     Stroke RN Care Coordinator Outreach:  Orders (MR & MRA)     Outreach attempted x 1.      Left message on  Evelyn's  voicemail with call back information and requested return call.    Stroke RN Care Coordinator will try to reach Evelyn again in 1-2 business days.    Lori Dolan BS, RN, SCRN  RN Stroke Neurology Care Coordinator  [facility] Neuroscience Service Line

## 2023-09-26 NOTE — TELEPHONE ENCOUNTER
[facility] Call Center    Phone Message    May a detailed message be left on voicemail: yes     Reason for Call: Other: Evelyn from [employer] is a Pt advocate is requesting a call back to speak about PA for MRI.    Please call Evelyn back at [phone] to discuss further.     Action Taken: Message routed to:  Other: [facility] Neurology    Travel Screening: Not Applicable

## 2023-09-28 DIAGNOSIS — G45.9 TIA (TRANSIENT ISCHEMIC ATTACK): Primary | ICD-10-CM

## 2023-09-28 RX ORDER — SIMVASTATIN 20 MG
20 TABLET ORAL AT BEDTIME
Qty: 30 TABLET | Refills: 11 | Status: SHIPPED | OUTPATIENT
Start: 2023-09-28 | End: 2023-10-24 | Stop reason: ALTCHOICE

## 2023-10-02 NOTE — TELEPHONE ENCOUNTER
[facility] Call Center    Phone Message    May a detailed message be left on voicemail: yes     Reason for Call: Other: Evelyn from [employer] calling to request a call back to discuss scheduling a peer to peer regarding Derek's imaging orders.     Action Taken: Message routed to:  Other: [facility]    Travel Screening: Not Applicable                                                                    [FreeTextEntry1] : all lab data was reviewed with patient in detail from 5/26/2021\par 81 yo woman with HTN, DM2, MGUS and CKD 4 with non nephrotic range proteinuria.\par --CKD 4-  creatinine 2.50- general stable range- no signs of uremia.\par Aware of potential need for RRT.\par maintaining low protein diet;\par No NSAID or PPI\par -proteinuria- c/w losartan\par -hyperkalemia-  K 4.6- has been able to restrict K intake- no need for potassium lowering agents at this time\par --HTN -  prefer < 140- ankle edema less- \par emphasized need to reduce salt intake\par reports that BP always better in [place]- needs to have checked while there.\par  -- DMT2-- A1C stable \par -hyperlipidemia-  LP controlled- c/w crestor\par - MGUS- faint band IgG Kappa- stable--\par -secondary hyperparathyroidism-  PTH controlled- c/w Vit D\par -anemia- hgb 10.8- asymptomatic- down from 11.8\par add Fe tabs TIW- explained that she might need ALEXIS if hgb drifts < 10 and Fe stores good\par will obtain TSat with next set of labs\par \par \par \par f/u 6-7 months- plans on being in [place] until Thanksgiving\par instructed to see MD there and have labs obtained at least one time while away\par \par \par

## 2023-10-03 ENCOUNTER — OFFICE VISIT (OUTPATIENT)
Dept: CARDIOLOGY | Facility: CLINIC | Age: 58
End: 2023-10-03
Payer: COMMERCIAL

## 2023-10-03 VITALS
DIASTOLIC BLOOD PRESSURE: 64 MMHG | HEIGHT: 70 IN | OXYGEN SATURATION: 98 % | SYSTOLIC BLOOD PRESSURE: 110 MMHG | HEART RATE: 70 BPM | BODY MASS INDEX: 24.67 KG/M2 | WEIGHT: 172.3 LBS

## 2023-10-03 DIAGNOSIS — I67.2 INTRACRANIAL ATHEROSCLEROSIS: ICD-10-CM

## 2023-10-03 DIAGNOSIS — Q21.12 PFO (PATENT FORAMEN OVALE): ICD-10-CM

## 2023-10-03 DIAGNOSIS — G45.9 TIA (TRANSIENT ISCHEMIC ATTACK): ICD-10-CM

## 2023-10-03 PROCEDURE — 99204 OFFICE O/P NEW MOD 45 MIN: CPT | Performed by: INTERNAL MEDICINE

## 2023-10-03 NOTE — PROGRESS NOTES
"CARDIOLOGY CLINIC CONSULTATION      REASON FOR CONSULT:   PFO    PRIMARY CARE PHYSICIAN:  [facility]        History of Present Illness   Derek Abbasi is an extremely pleasant 58 year old male here as a new patient for evaluation of PFO.  On 9/7/2023, he was briefly hospitalized for atypical neurologic symptoms potentially but not definitively concerning for a TIA.  He describes that he had just done 30 push-ups and then hopped in the shower and had just a very \"weird feeling.\"  He found it difficult to speak and also difficult to perform various actions, primarily with the right side of his body (though he says he did not try using the left side of his body).  He went in for evaluation and was seen by stroke neurology.  He had a brain MRI that was unremarkable.  He had a CTA of the head and neck which showed no significant carotid disease, no large vessel occlusion intracranially, but did show a moderate-severe focal stenosis of the right anterior cerebral artery origin, as well as moderate focal stenosis of the adjacent supraclinoid right internal carotid artery.  Finally, he had an echocardiogram which I personally reviewed the images of, and this did show a positive bubble study suggestive of a PFO with right to left shunting.  He is currently wearing an outpatient cardiac monitor, and the results of this so far (which I reviewed today) show only sinus rhythm.  He is a never smoker and has no family history of heart disease that he is aware of.    Since his initial episode, he has not had any recurrence of symptoms.  He had no similar episodes prior either.  He has no cardiac complaints including no chest pain, shortness of breath, lower extremity swelling, palpitations, or syncopal episodes.        Assessment & Plan     Single episode of atypical neurologic symptoms (possible TIA) on 9/7/2023  Moderate-severe focal stenosis of the right anterior cerebral artery and adjacent supraclinoid right " internal carotid artery on CTA  PFO, with small-moderate right to left interatrial shunting on bubble study  Prediabetes      It was a pleasure to meet with Derek and his wife in clinic today.  We discussed his neurologic symptoms in detail, as well as the potential cardiac contributions to his episode.  As far as I can tell, there is no definite evidence that he had a CVA, though TIA is being considered.  Based on his description of the episode, I suspect that he may have had a vasovagal episode in the shower which precipitated some watershed cerebral hypoperfusion given the moderate-severe intracranial stenosis seen on CTA, which then resolved following resolution of his vasovagal episode.  Presuming this is the case, PFO closure would certainly not be indicated.  Alternatively, if this were a classic TIA/CVA, PFO closure could theoretically be considered.  However, I think that his intracranial cerebrovascular disease on CTA suggest that atherosclerotic small vessel ischemic disease would be a more likely explanation than paradoxical embolism, and at age 58 he is also at the upper limit of age when we have trial data to support PFO closure.  For these reasons, I do not think that PFO closure would be helpful.  Similarly, I think that it is important to follow-up on the results of his outpatient cardiac monitor, but even if atrial fibrillation were discovered, I think this would be considered an incidental finding, as I do not think that this likely caused his symptoms.  Still, however, we would certainly need to address atrial fibrillation if it was seen.  Otherwise, if the monitor remains normal, I do not think that routine cardiology follow-up is required, and we can see him back on an as-needed basis.      On the date of the patient's visit, I spent a total of 54 minutes reviewing the patient's chart; interviewing, examining, and counseling the patient; coordinating with other providers as necessary, entering  orders, and documenting in the medical chart.      Thompson Temple MD  Interventional Cardiology  October 3, 2023        Medications   Current Outpatient Medications   Medication    acetaminophen (TYLENOL) 325 MG tablet    aspirin (ASA) 325 MG EC tablet    clopidogrel (PLAVIX) 75 MG tablet    multivitamin w/minerals (THERA-VIT-M) tablet    Omega-3 Fatty Acids (FISH OIL) 1200 MG capsule    simvastatin (ZOCOR) 20 MG tablet     No current facility-administered medications for this visit.     Allergies   No Known Allergies      Physical Exam       BP: 110/64 Pulse: 70     SpO2: 98 %      Vital Signs with Ranges  Pulse:  [70] 70  BP: (110)/(64) 110/64  SpO2:  [98 %] 98 %  172 lbs 4.8 oz    Constitutional: Well-appearing, no acute distress  Respiratory: Normal respiratory effort, CTAB  Cardiovascular: RRR, no m/r/g.  JVP < 7 cm H2O.  There is no LE edema.  Normal carotid upstrokes, no carotid bruits.

## 2023-10-03 NOTE — TELEPHONE ENCOUNTER
Stroke RN Care Coordination - Unable to Reach / Voicemail Note     Stroke RN Care Coordinator Outreach:  Orders (MR & MRA) and Call Back (Regarding peer to peer)     Outreach attempted x 2.      Left message on  Evelyn's  voicemail with call back information and requested return call. Also provided my email address if communication would be easier via that mode.    Stroke RN Care Coordinator will try to reach Evelyn again in 1-2 business days.    Lori Dolan BS, RN, SCRN  RN Stroke Neurology Care Coordinator  [facility] Neuroscience Service Line

## 2023-10-03 NOTE — LETTER
"10/3/2023    [facility]  [address]  [facility] [address]    RE: Derek Abbasi       Dear Colleague,     I had the pleasure of seeing Derek Abbasi in the [facility].  CARDIOLOGY CLINIC CONSULTATION      REASON FOR CONSULT:   PFO    PRIMARY CARE PHYSICIAN:  [facility]        History of Present Illness  Derek Abbasi is an extremely pleasant 58 year old male here as a new patient for evaluation of PFO.  On 9/7/2023, he was briefly hospitalized for atypical neurologic symptoms potentially but not definitively concerning for a TIA.  He describes that he had just done 30 push-ups and then hopped in the shower and had just a very \"weird feeling.\"  He found it difficult to speak and also difficult to perform various actions, primarily with the right side of his body (though he says he did not try using the left side of his body).  He went in for evaluation and was seen by stroke neurology.  He had a brain MRI that was unremarkable.  He had a CTA of the head and neck which showed no significant carotid disease, no large vessel occlusion intracranially, but did show a moderate-severe focal stenosis of the right anterior cerebral artery origin, as well as moderate focal stenosis of the adjacent supraclinoid right internal carotid artery.  Finally, he had an echocardiogram which I personally reviewed the images of, and this did show a positive bubble study suggestive of a PFO with right to left shunting.  He is currently wearing an outpatient cardiac monitor, and the results of this so far (which I reviewed today) show only sinus rhythm.  He is a never smoker and has no family history of heart disease that he is aware of.    Since his initial episode, he has not had any recurrence of symptoms.  He had no similar episodes prior either.  He has no cardiac complaints including no chest pain, shortness of breath, lower extremity swelling, palpitations, or " syncopal episodes.        Assessment & Plan    Single episode of atypical neurologic symptoms (possible TIA) on 9/7/2023  Moderate-severe focal stenosis of the right anterior cerebral artery and adjacent supraclinoid right internal carotid artery on CTA  PFO, with small-moderate right to left interatrial shunting on bubble study  Prediabetes      It was a pleasure to meet with Derek and his wife in clinic today.  We discussed his neurologic symptoms in detail, as well as the potential cardiac contributions to his episode.  As far as I can tell, there is no definite evidence that he had a CVA, though TIA is being considered.  Based on his description of the episode, I suspect that he may have had a vasovagal episode in the shower which precipitated some watershed cerebral hypoperfusion given the moderate-severe intracranial stenosis seen on CTA, which then resolved following resolution of his vasovagal episode.  Presuming this is the case, PFO closure would certainly not be indicated.  Alternatively, if this were a classic TIA/CVA, PFO closure could theoretically be considered.  However, I think that his intracranial cerebrovascular disease on CTA suggest that atherosclerotic small vessel ischemic disease would be a more likely explanation than paradoxical embolism, and at age 58 he is also at the upper limit of age when we have trial data to support PFO closure.  For these reasons, I do not think that PFO closure would be helpful.  Similarly, I think that it is important to follow-up on the results of his outpatient cardiac monitor, but even if atrial fibrillation were discovered, I think this would be considered an incidental finding, as I do not think that this likely caused his symptoms.  Still, however, we would certainly need to address atrial fibrillation if it was seen.  Otherwise, if the monitor remains normal, I do not think that routine cardiology follow-up is required, and we can see him back on an  as-needed basis.      On the date of the patient's visit, I spent a total of 54 minutes reviewing the patient's chart; interviewing, examining, and counseling the patient; coordinating with other providers as necessary, entering orders, and documenting in the medical chart.      Thompson Temple MD  Interventional Cardiology  October 3, 2023        Medications  Current Outpatient Medications   Medication    acetaminophen (TYLENOL) 325 MG tablet    aspirin (ASA) 325 MG EC tablet    clopidogrel (PLAVIX) 75 MG tablet    multivitamin w/minerals (THERA-VIT-M) tablet    Omega-3 Fatty Acids (FISH OIL) 1200 MG capsule    simvastatin (ZOCOR) 20 MG tablet     No current facility-administered medications for this visit.     Allergies  No Known Allergies      Physical Exam      BP: 110/64 Pulse: 70     SpO2: 98 %      Vital Signs with Ranges  Pulse:  [70] 70  BP: (110)/(64) 110/64  SpO2:  [98 %] 98 %  172 lbs 4.8 oz    Constitutional: Well-appearing, no acute distress  Respiratory: Normal respiratory effort, CTAB  Cardiovascular: RRR, no m/r/g.  JVP < 7 cm H2O.  There is no LE edema.  Normal carotid upstrokes, no carotid bruits.      Thank you for allowing me to participate in the care of your patient.      Sincerely,     Thompson Temple MD     [facility]  cc:   Omar Goetz, SAHRA CNP  [address] [place] [address]

## 2023-10-10 ENCOUNTER — VIRTUAL VISIT (OUTPATIENT)
Dept: EDUCATION SERVICES | Facility: CLINIC | Age: 58
End: 2023-10-10
Payer: COMMERCIAL

## 2023-10-10 DIAGNOSIS — R73.03 PRE-DIABETES: Primary | ICD-10-CM

## 2023-10-10 PROCEDURE — 97802 MEDICAL NUTRITION INDIV IN: CPT | Mod: 93 | Performed by: DIETITIAN, REGISTERED

## 2023-10-10 NOTE — TELEPHONE ENCOUNTER
Stroke RN Care Coordination - Unable to Reach / Voicemail Note     Stroke RN Care Coordinator Outreach:  Orders (MR & MRA) and Call Back (Regarding peer to peer)     Received VM from Jhonatan with [employer] informing me that he will be taking over the pt's case for Evelyn. He requested a return call to discuss the status of the P2P review. Outreach attempted x 1 to reach Jhonatan.      Left message on  Jhonatan's  voicemail with call back information and requested return call.    Stroke RN Care Coordinator will try to reach Jhonatan again in 1-2 business days.    Lori Dolan BS, RN, SCRN  RN Stroke Neurology Care Coordinator  [facility] Neuroscience Service Line

## 2023-10-10 NOTE — LETTER
10/10/2023         RE: Derek Abbasi  [id number] [facility] [address]        Dear Colleague,    Thank you for referring your patient, Derek Abbasi, to the [facility]. Please see a copy of my visit note below.    Medical Nutrition Therapy  Visit Type:Initial assessment and intervention  Derek Abbasi presents today for MNT and education related to prediabetes.   He is accompanied by self.   Type of Service: Telephone Visit/ 34 minutes     Originating Location (Patient Location): Home  Distant Location (Provider Location): Offsite  Mode of Communication:  Telephone     Telephone Visit Start Time: 08:05 AM  Telephone Visit End Time (telephone visit stop time): 8:39 AM     How would patient like to obtain AVS? MyChart    ASSESSMENT:   Patient comments/concerns relating to nutrition: patient has questions regarding the new diagnosis of pre-diabetes.  Has recently lost about 10 lbs by choosing smaller portions. Trying to be more mindful. One area he could improve on is fruit intake. Has been eating more brown rice and brown pasta - smaller intake. Eats small amount of red meat - more poultry. Likes nuts for a snack. Patient has been making good changes, and continues to be active daily.  Reviewed prediabetes diagnosis  Explained what prediabetes is, lab values and discussed ways to prevent or delay getting diabetes with healthy lifestyle choices.   Reviewed benefits of exercise 30 minutes a day, at least five times per week (or 150 minutes of exercise per week).  Discussed importance of loosing weight if you need to. If you are overweight, losing just 5 to 10% of your body weight (an  average of 15 pounds) may reduce your risk.    Calorie intake reviewed and the plate method. Choose healthy, nutritious, and unprocessed foods (lean meats, plant proteins, whole grains, fruits and vegetables).    If your doctor diagnosed you with pre-diabetes, reviewed following up every 6  months to a year with your provider to check for diabetes.    Discussed eating whole foods as much as possible, these are more nutritious with vitamins/minerals and fiber. Look out for added sugar, read nutrition labels and look at the grams of added sugar. Goal is 25g per day or less for women and 35g per day or less for men. Label reading reviewed.    Lab Results   Component Value Date    A1C 5.7 09/07/2023     NUTRITION HISTORY:  Breakfast: was having a lot of cereal - now eating more cheerios (plain); was having honey nut cheerios  Lunch: whole wheat bread sandwich  Dinner: eats out occasionally   Snacks: nuts, almonds  Beverages: Water, 1% milk - did cut back on this some. Occasionally diet sodas. Social drinker    Misses meals? Not often  Eats out:  a few times a week.    Previous diet education:  No     Food allergies/intolerances: none    Diet is high in: calories, vegetables  Diet is low in: fruits    EXERCISE: works on getting 10,000 steps a day. Active daily. Hiking, biking  SOCIO/ECONOMIC:   Lives with: spouse    MEDICATIONS:  Current Outpatient Medications   Medication     acetaminophen (TYLENOL) 325 MG tablet     aspirin (ASA) 325 MG EC tablet     clopidogrel (PLAVIX) 75 MG tablet     multivitamin w/minerals (THERA-VIT-M) tablet     Omega-3 Fatty Acids (FISH OIL) 1200 MG capsule     simvastatin (ZOCOR) 20 MG tablet     No current facility-administered medications for this visit.       LABS:  Lab Results   Component Value Date     09/08/2023      Lab Results   Component Value Date    POTASSIUM 4.5 09/08/2023     Lab Results   Component Value Date    CHLORIDE 105 09/08/2023     Lab Results   Component Value Date    ADITYA 9.7 09/08/2023     Lab Results   Component Value Date    CO2 24 09/08/2023     Lab Results   Component Value Date    BUN 21.4 09/08/2023     Lab Results   Component Value Date    CR 0.98 09/08/2023     Lab Results   Component Value Date    GLC 94 09/08/2023    GLC 95 09/08/2023      Lab Results   Component Value Date    LDL 88 09/08/2023     Direct Measure HDL   Date Value Ref Range Status   09/08/2023 40 >=40 mg/dL Final   ]  GFR Estimate   Date Value Ref Range Status   09/08/2023 88 >60 mL/min/1.73m2 Final     Lab Results   Component Value Date    CR 0.98 09/08/2023     No results found for: MICROALBUMIN    ANTHROPOMETRICS:  Vitals: There were no vitals taken for this visit.  There is no height or weight on file to calculate BMI.      Wt Readings from Last 5 Encounters:   10/03/23 78.2 kg (172 lb 4.8 oz)   09/07/23 81.2 kg (179 lb)       Weight Change: has lost 10 lbs in the past few weeks    ESTIMATED KCAL REQUIREMENTS:  8840-8285 kcal per day  NUTRITION DIAGNOSIS: No nutrition diagnosis at this time     NUTRITION INTERVENTION:  Motivational Interviewing    PATIENT'S BEHAVIOR CHANGE GOALS:   See Patient Instructions for patient stated behavior change goals. AVS was printed and given to patient at today's appointment.    MONITOR / EVALUATE:  RD will monitor/evaluate:  Food and nutrition knowledge / skills  Pertinent Labs    FOLLOW-UP:  Follow up with RD as needed.    Time spent in minutes: 35  Encounter: Individual

## 2023-10-10 NOTE — PROGRESS NOTES
Medical Nutrition Therapy  Visit Type:Initial assessment and intervention  Derek Abbasi presents today for MNT and education related to prediabetes.   He is accompanied by self.   Type of Service: Telephone Visit/ 34 minutes     Originating Location (Patient Location): Home  Distant Location (Provider Location): Offsite  Mode of Communication:  Telephone     Telephone Visit Start Time: 08:05 AM  Telephone Visit End Time (telephone visit stop time): 8:39 AM     How would patient like to obtain AVS? MyChart    ASSESSMENT:   Patient comments/concerns relating to nutrition: patient has questions regarding the new diagnosis of pre-diabetes.  Has recently lost about 10 lbs by choosing smaller portions. Trying to be more mindful. One area he could improve on is fruit intake. Has been eating more brown rice and brown pasta - smaller intake. Eats small amount of red meat - more poultry. Likes nuts for a snack. Patient has been making good changes, and continues to be active daily.  Reviewed prediabetes diagnosis  Explained what prediabetes is, lab values and discussed ways to prevent or delay getting diabetes with healthy lifestyle choices.   Reviewed benefits of exercise 30 minutes a day, at least five times per week (or 150 minutes of exercise per week).  Discussed importance of loosing weight if you need to. If you are overweight, losing just 5 to 10% of your body weight (an  average of 15 pounds) may reduce your risk.    Calorie intake reviewed and the plate method. Choose healthy, nutritious, and unprocessed foods (lean meats, plant proteins, whole grains, fruits and vegetables).    If your doctor diagnosed you with pre-diabetes, reviewed following up every 6 months to a year with your provider to check for diabetes.    Discussed eating whole foods as much as possible, these are more nutritious with vitamins/minerals and fiber. Look out for added sugar, read nutrition labels and look at the grams of added sugar.  Goal is 25g per day or less for women and 35g per day or less for men. Label reading reviewed.    Lab Results   Component Value Date    A1C 5.7 09/07/2023     NUTRITION HISTORY:  Breakfast: was having a lot of cereal - now eating more cheerios (plain); was having honey nut cheerios  Lunch: whole wheat bread sandwich  Dinner: eats out occasionally   Snacks: nuts, almonds  Beverages: Water, 1% milk - did cut back on this some. Occasionally diet sodas. Social drinker    Misses meals? Not often  Eats out:  a few times a week.    Previous diet education:  No     Food allergies/intolerances: none    Diet is high in: calories, vegetables  Diet is low in: fruits    EXERCISE: works on getting 10,000 steps a day. Active daily. Hiking, biking  SOCIO/ECONOMIC:   Lives with: spouse    MEDICATIONS:  Current Outpatient Medications   Medication    acetaminophen (TYLENOL) 325 MG tablet    aspirin (ASA) 325 MG EC tablet    clopidogrel (PLAVIX) 75 MG tablet    multivitamin w/minerals (THERA-VIT-M) tablet    Omega-3 Fatty Acids (FISH OIL) 1200 MG capsule    simvastatin (ZOCOR) 20 MG tablet     No current facility-administered medications for this visit.       LABS:  Lab Results   Component Value Date     09/08/2023      Lab Results   Component Value Date    POTASSIUM 4.5 09/08/2023     Lab Results   Component Value Date    CHLORIDE 105 09/08/2023     Lab Results   Component Value Date    ADITYA 9.7 09/08/2023     Lab Results   Component Value Date    CO2 24 09/08/2023     Lab Results   Component Value Date    BUN 21.4 09/08/2023     Lab Results   Component Value Date    CR 0.98 09/08/2023     Lab Results   Component Value Date    GLC 94 09/08/2023    GLC 95 09/08/2023     Lab Results   Component Value Date    LDL 88 09/08/2023     Direct Measure HDL   Date Value Ref Range Status   09/08/2023 40 >=40 mg/dL Final   ]  GFR Estimate   Date Value Ref Range Status   09/08/2023 88 >60 mL/min/1.73m2 Final     Lab Results   Component Value  Date    CR 0.98 09/08/2023     No results found for: MICROALBUMIN    ANTHROPOMETRICS:  Vitals: There were no vitals taken for this visit.  There is no height or weight on file to calculate BMI.      Wt Readings from Last 5 Encounters:   10/03/23 78.2 kg (172 lb 4.8 oz)   09/07/23 81.2 kg (179 lb)       Weight Change: has lost 10 lbs in the past few weeks    ESTIMATED KCAL REQUIREMENTS:  4144-4712 kcal per day  NUTRITION DIAGNOSIS: No nutrition diagnosis at this time     NUTRITION INTERVENTION:  Motivational Interviewing    PATIENT'S BEHAVIOR CHANGE GOALS:   See Patient Instructions for patient stated behavior change goals. AVS was printed and given to patient at today's appointment.    MONITOR / EVALUATE:  RD will monitor/evaluate:  Food and nutrition knowledge / skills  Pertinent Labs    FOLLOW-UP:  Follow up with RD as needed.    Time spent in minutes: 35  Encounter: Individual

## 2023-10-10 NOTE — PATIENT INSTRUCTIONS
Thank you for Visiting me today regarding Pre-diabetes and Medical Nutrition Therapy    What is pre-diabetes?  Pre-diabetes is when your blood sugar level is higher than normal, but not high enough to be called diabetes. Pre-diabetes will usually turn into diabetes in a short time if you do not change your health habits. Damage to your body, especially the heart and blood vessels, may already be occurring with pre-diabetes.If you have pre-diabetes, it is important to start making healthy choices now.    How can I reduce my risk of getting diabetes?  You can prevent or delay getting diabetes with healthy lifestyle choices. These steps can reduce the risk for diabetes in 6 out of 10 people:  1. Exercise 30 minutes a day, at least five times per week (or 150 minutes of exercise per week).    2. Lose weight if you need to. If you are overweight, losing just 5 to 10% of your body weight (an  average of 15 pounds) may reduce your risk.    3. Cut back calories in your diet. Choose healthy, nutritious, and unprocessed foods (lean meats, plant proteins, whole grains, fruits and vegetables).    If your doctor diagnosed you with pre-diabetes, you should also see your doctor every year to check for diabetes.    Healthy Eating:  Plate method eating: fill half your plate with non-starchy vegetables, 1/4 plate with lean protein, 1/4 plate with carbohydrates (think whole grains/fruit/starchy vegetables/bean or legumes). Check out this web page for more details.  https://www.diabetesfoodhub.org/articles/what-is-the-diabetes-plate-method.html    2. Try to eat whole foods as much as possible, these are more nutritious with vitamins/minerals and fiber. Look out for added sugar, read nutrition labels and look at the grams of added sugar. Goal is 25g per day or less for women and 35g per day or less for men.    3. Only eat when you are truly hungry and choose whole foods as much as possible.Save sweets and other treats for special  occassions.     4. Check out this website for TONS of healthy recipes:  https://www.diabetesfoodhub.org/all-recipes.html    Snacks  Try to limit snacking to only when you are truly hungry (not tired, thirsty ,or bored). Here are some snack ideas.. Adding protein or small amount of healthy fat to a snack helps you to feel full longer and eat less. Choose whole grains when able. Also remember non-starchy vegetables are a fantastic snack, add some hummus or dip to go with it.   - Apple or small banana  or celery/carrots with 1-2 tbs nut butter  - 1/2 cup cottage cheese with peach or pear (size of tennis ball)  - 1/2 cup tuna w/ferreira OR 1/2 avocado/guacamole on whole wheat toast OR ladonna bread OR cut up veggies  - 1/4 cup hummus (and salsa if desired) with ~10 tortilla chips  - 1 cup plain greek yogurt with 1 cup berries  - 4-6oz flavored greek yogurt (look for lower added sugar)  - 1/4 cup nuts or trail mix  - 3 cups popcorn  -1+ cup edamame       Thank you!  Mere Maloney RDN, [place], [place]   [facility] &   [phone]

## 2023-10-12 NOTE — TELEPHONE ENCOUNTER
Received return call from Jhonatan. He states that to the best of his knowledge, the next step after having received the initial denial is to schedule a peer to peer review between their clinicians and our stroke provider Dr. Frances. He advised that we need to call [phone] option 5 to schedule the review. If the imaging is further denied after peer to peer then we could move forward in submitting a formal appeal. It was advised though to to peer to peer prior to submitting a formal appeal if wanting sooner decision.    Email communication sent high priority to Dr. Frances and Elisabeth ORO inquiring if they can call directly to schedule the peer to peer given their limited availability. Also routing in epic.      Lori Dolan BS, RN, SCRN  RN Stroke Neurology Care Coordinator  [facility] Neuroscience Service Line

## 2023-10-20 DIAGNOSIS — G45.9 TIA (TRANSIENT ISCHEMIC ATTACK): ICD-10-CM

## 2023-10-24 ENCOUNTER — VIRTUAL VISIT (OUTPATIENT)
Dept: NEUROLOGY | Facility: CLINIC | Age: 58
End: 2023-10-24
Payer: COMMERCIAL

## 2023-10-24 VITALS
DIASTOLIC BLOOD PRESSURE: 60 MMHG | SYSTOLIC BLOOD PRESSURE: 110 MMHG | WEIGHT: 168 LBS | HEIGHT: 71 IN | BODY MASS INDEX: 23.52 KG/M2

## 2023-10-24 DIAGNOSIS — G45.9 TIA (TRANSIENT ISCHEMIC ATTACK): ICD-10-CM

## 2023-10-24 DIAGNOSIS — R46.89 SPELL OF ABNORMAL BEHAVIOR: Primary | ICD-10-CM

## 2023-10-24 DIAGNOSIS — G47.19 EXCESSIVE DAYTIME SLEEPINESS: ICD-10-CM

## 2023-10-24 PROCEDURE — 99215 OFFICE O/P EST HI 40 MIN: CPT | Mod: 95 | Performed by: PHYSICIAN ASSISTANT

## 2023-10-24 RX ORDER — ROSUVASTATIN CALCIUM 10 MG/1
10 TABLET, COATED ORAL DAILY
Qty: 90 TABLET | Refills: 0 | Status: SHIPPED | OUTPATIENT
Start: 2023-10-24 | End: 2024-01-18

## 2023-10-24 RX ORDER — ASPIRIN 325 MG
325 TABLET, DELAYED RELEASE (ENTERIC COATED) ORAL DAILY
Qty: 30 TABLET | Refills: 3 | Status: SHIPPED | OUTPATIENT
Start: 2023-10-24

## 2023-10-24 RX ORDER — CLOPIDOGREL BISULFATE 75 MG/1
75 TABLET ORAL DAILY
COMMUNITY
Start: 2023-09-08 | End: 2023-12-07

## 2023-10-24 ASSESSMENT — PAIN SCALES - GENERAL: PAINLEVEL: NO PAIN (0)

## 2023-10-24 NOTE — PROGRESS NOTES
"Virtual Visit Details    Type of service:  Telephone Visit   Phone call duration: 33 minutes     __________________________________________________________      [facility] [place]    [phone]  __________________________________________________________    Chief Complaint: TIA follow-up    History of Present Illness: Derek Abbasi is a 58 year old male with pertinent past medical history of prostate cancer (diagnosed 4-5 years ago, in active surveillance program, no current treatment, prediabetes.    Was seen at [facility] on 9/8/2023 with initial presenting symptoms of generalized weakness, off balance, nausea, right hand/leg weakness/incoordination x15 minutes then associated twitching/jerking movements to the right foot/shoulder with slowed responses \"out of it\" x1 hour.  BP on arrival 144/82.  MRI was negative for stroke but CTA showed multifocal ICAD to R RANDAL/supraclinoid ICA,  L MCA/PCA.  Work-up as described below.  TTE showed a moderate PFO.  EEG was negative for seizure epileptiform activity.  Started on DAPT with aspirin 325 mg daily and Plavix 75 mg daily plan 90-day course to be followed by monotherapy with  mg daily. Recommended Lipitor 40 mg daily (was later switched to Simvastatin 20 mg daily due to some symptoms of nostril soreness and continual dull ache to top row of teeth-worse at night).     Recommended 30 day heart monitoring which resulted negative for atrial fibrillation. Was not felt to be a candidate for PFO closure by our team but did follow-up with cardiology who agreed no indication at this time for PFO closure.     Recommended MRI/A 7T with vessel wall imaging, to further evaluate vasculopathy which has not yet been performed as denied by insurance-this was communicated with Dr. Frances and SHORTY Rosas.  I discussed imaging further with vascular neurologist Dr. Hollingsworth today and feels 7T MRI for the unlikely to change current management " given the evidence of stenoses visible on CTA.  Discussed with patient that he deferred for now we will await further from Dr. Frances he felt the imaging is necessary and receives prior authorization.  Patient just request that this be scheduled if needed prior to the end of the year as he has met his deductible.    Today seen in clinic and describes event as he had done his usual 30 push-ups as he does in the a.m. and then got in the shower.  Symptoms as described above began.  He has never had any symptoms like that prior or any recurrent episodes since.  It is possible this was a vasovagal episode given the sudden change in position and warm shower that resulted in decreased cerebral perfusion through his left sided arterial stenoses.  We discussed that the symptoms following of foot/shoulder twitching and being out of it for approximately 1 hour or more concerning for seizure and less like TIA.  Not able to rule out TIA given initial right hand/leg weakness/incoordination x15 minutes.    Did discuss his symptoms of tooth/nostril soreness, simvastatin is generally associated with increased side effects compared to atorvastatin and generally atorvastatin or rosuvastatin are preferred.  Rosuvastatin generally is better tolerated and patient is open to trying this medication instead.  He denies any fevers, night sweats, or intentional weight loss.  He intentionally has lost approximately 10 pounds due to diagnosis of prediabetes.  He denies any history of smoking.  He does report chronic daytime sleepiness throughout his entire adulthood, only occasional snoring.  Discussed that this could be a symptom of sleep apnea.  From stroke perspective this can be associated with increased risk of stroke due to poorly controlled blood pressures.  His blood pressure however has always been very well controlled 110/60.  He had checked it several times after hospitalization but then stopped.  Recommend that he check it twice  "daily for 1-2 weeks if consistently at goal then not feel strongly regarding need for sleep apnea testing.    TIA Evaluation summarized:  MRI/Head CT: MRI: No acute findings  Head vessels: CTA head: Multifocal iCAD involving R RANDAL, R supraclinoid ICA, L MCA/PCA  Neck vessels: CTA neck: No significant stenosis  Echo: TTE: EF 60-65%, normal LA size, PFO present with moderately positive bubble study, no regional WMA's  EKG/Tele: SR  LDL: 2023: 88 mg/dL  A1c: 2023: 5.7%  Other testin day heart monitor: SR throughout    ABCD2 Patients Score   Age ? 58 years 1 point 1   Blood Pressure    SBP ? 140 or DBP ?  90     1 point 1   Clinical Features    - Unilateral weakness    - Speech disturbance w/o weakness    - Other    2 points  1 point     0 points 0   Duration of symptoms    ? 60 minutes    10-59 minutes    < 10 minutes    2 points  1 point  0 points 1   Diabetes  1 point 0   Patient s ABCD2 Score (0-7) = 3         Modified [place] Scale  Score: 0-No symptoms    Impression:   Problem List Items Addressed This Visit          Circulatory    TIA (transient ischemic attack)     66 M  Transient episode of generalized weakness, off balance, nausea, right hand/leg weakness/incoordination x 15 minutes that occurred in shower after he had just finished doing pushups prior, query possible Transient ischemic attack (TIA), ABCD2 score 3 that could have resulted from some cerebral hypoperfusion to the Left hemisphere from his intracranial atherosclerotic disease (L MCA/PCA)   Subsequent twitching/jerking movements to the right foot/shoulder with slowed responses \"out of it\" x1 hour. etiology uncertain. History is not typical for TIA, query possible seizure, EEG was negative   Multifocal intracranial athero - unclear etiology given apparent lack of significant cerebrovascular risk factors. No history suggestive of RCVS, suspect atherosclerotic etiology (will start regular BP monitoring) vs less likely infectious or " inflammatory vasculopathy  PFO - unclear if related to current symptoms, ROPE score does not apply to TIA (but if were stroke ROPE score would be 4), do not etiology of TIA    Plan:   Medications:  -Complete total 90 day course of plavix then stop  -Continue  mg daily indefinitely  -Switch simvastatin 20 mg daily to Crestor 10 mg daily     Diagnostic testing:  -Labs: repeat Lipid panel in 3 months at next follow-up     Antiplatelet (ASA/Plavix) precautions:  -Contact provider immediately with any signs of bleeding or black/tarry stools  -if any future providers request that you hold or stop taking this medication then please reach out to our stroke team to be included in the discussion.    Daily activities:  -home blood pressure monitoring twice daily AM and PM, keep log and bring to medical visits  -Mediterranean diet can be beneficial for overall decreased cardiovascular risk, moderate aerobic exercise at least 30 minutes/day x 5 days/week    Follow-up:  -Ongoing follow-up with PCP for management of vascular risk factors: BP goal <130/80, A1c goal <7%, LDL goal 40-70 (<40 increases risk of intracranial hemorrhage)  -Follow-up again with general neurology team in 3 months   -if notice that blood pressures not consistently within goal <130/80 then recommend follow-up with sleep medicine clinic to evaluate for sleep apnea    Precautions:  - Call our stroke clinic with any questions or concerns at [phone], or send a [employer] medical advice message  - Call [phone] with any new stroke symptoms    B - Balance problems  E - Eyes (vision loss)  F - Facial weakness or droop  A - Arm weakness  S - Speech/language  T - Time is brain!    Discussed with vascular neurology attending, Dr. Hollingsworth    Stroke Education provided.  He will call us with any questions.  For any acute neurologic deficits he was advised to  go directly to the hospital rather than call the clinic.    Paty Lucia PA-C  Neurology  10/24/2023 11:07  "AM  To page me or covering stroke neurology team member, click here: AMCOM  Choose \"On Call\" tab at top, then search dropdown box for \"Neurology Adult\" & press Enter, look for Neuro ICU/Stroke    ___________________________________________________________________    Current Medications  Current Outpatient Medications   Medication Sig    acetaminophen (TYLENOL) 325 MG tablet Take 2 tablets (650 mg) by mouth every 4 hours as needed for mild pain or fever (temperatures greater than 100.4  F (38  C))    aspirin (ASA) 325 MG EC tablet Take 1 tablet (325 mg) by mouth daily    clopidogrel (PLAVIX) 75 MG tablet Take 1 tablet (75 mg) by mouth daily    multivitamin w/minerals (THERA-VIT-M) tablet Take 1 tablet by mouth daily    Omega-3 Fatty Acids (FISH OIL) 1200 MG capsule Take 1,200 mg by mouth daily     No current facility-administered medications for this visit.       Past Medical History  No past medical history on file.    Social History  Social History     Tobacco Use    Smoking status: Never     Passive exposure: Never    Smokeless tobacco: Never   Vaping Use    Vaping Use: Never used   Substance Use Topics    Drug use: Never       Family History  No family history on file.    Physical Exam         No data to display                  General:  no acute distress appreciated  Pulmonary:  no respiratory distress appreciated    Neurologic  Mental Status:  alert, oriented x 3, follows commands, speech clear and fluent  Cranial Nerves: hearing not formally tested but intact to conversation, no dysarthria  Motor:  unable to test (telephone)  Reflexes:  unable to test (telestroke)  Sensory:  unable to test (telestroke)  Coordination:  unable to test (telephone)  Station/Gait:  unable to test (telestroke)    Neuroimaging: as per HPI. I personally reviewed those images     Labs:    Coagulation studies:  No lab results found.     Lipid panel:  Recent Labs   Lab Test 09/08/23  0556   CHOL 139   HDL 40   LDL 88   TRIG 55 "       HbA1C:  Recent Labs   Lab Test 09/07/23  1022   A1C 5.7*         Billing:    I spent a total of 60 minutes on the day of the visit.   Time spent by me doing chart review, history and exam, documentation and further activities per the note    *All or a portion of this note was generated using voice recognition software and may contain transcription errors.

## 2023-10-24 NOTE — PATIENT INSTRUCTIONS
Medications:  -Complete total 90 day course of plavix then stop  -Continue  mg daily indefinitely  -Switch simvastatin 20 mg daily to Crestor 10 mg daily     Diagnostic testing:  -Labs: repeat Lipid panel in 3 months at next follow-up     Antiplatelet (ASA/Plavix) precautions:  -Contact provider immediately with any signs of bleeding or black/tarry stools  -if any future providers request that you hold or stop taking this medication then please reach out to our stroke team to be included in the discussion.    Daily activities:  -home blood pressure monitoring twice daily AM and PM, keep log and bring to medical visits  -Mediterranean diet can be beneficial for overall decreased cardiovascular risk, moderate aerobic exercise at least 30 minutes/day x 5 days/week    Follow-up:  -Ongoing follow-up with PCP for management of vascular risk factors: BP goal <130/80, A1c goal <7%, LDL goal 40-70 (<40 increases risk of intracranial hemorrhage)  -Follow-up again with general neurology team in 3 months   -if notice that blood pressures not consistently within goal <130/80 then recommend follow-up with sleep medicine clinic to evaluate for sleep apnea    Precautions:  - Call our stroke clinic with any questions or concerns at [phone], or send a [employer] medical advice message  - Call 911 with any new stroke symptoms    B - Balance problems  E - Eyes (vision loss)  F - Facial weakness or droop  A - Arm weakness  S - Speech/language  T - Time is brain!

## 2023-10-24 NOTE — NURSING NOTE
Is the patient currently in the state of [place]? YES    Visit mode:TELEPHONE    If the visit is dropped, the patient can be reconnected by: TELEPHONE VISIT: Phone number: [phone]    Will anyone else be joining the visit? NO  (If patient encounters technical issues they should call [phone]    How would you like to obtain your AVS? MyChart    Are changes needed to the allergy or medication list? No    Reason for visit: Stroke  Has had some ongoing upset stomach since starting medications.  Alice Roy MA

## 2023-10-24 NOTE — LETTER
"    10/24/2023         RE: Derek Abbasi  [address] [address] [address]        Dear Colleague,    Thank you for referring your patient, Derek Abbasi, to the [facility]. Please see a copy of my visit note below.    Virtual Visit Details    Type of service:  Telephone Visit   Phone call duration: 33 minutes     __________________________________________________________      [facility] [place]    [phone]  __________________________________________________________    Chief Complaint: TIA follow-up    History of Present Illness: Derek Abbasi is a 58 year old male with pertinent past medical history of prostate cancer (diagnosed 4-5 years ago, in active surveillance program, no current treatment, prediabetes.    Was seen at [facility] on 9/8/2023 with initial presenting symptoms of generalized weakness, off balance, nausea, right hand/leg weakness/incoordination x15 minutes then associated twitching/jerking movements to the right foot/shoulder with slowed responses \"out of it\" x1 hour.  BP on arrival 144/82.  MRI was negative for stroke but CTA showed multifocal ICAD to R RANDAL/supraclinoid ICA,  L MCA/PCA.  Work-up as described below.  TTE showed a moderate PFO.  EEG was negative for seizure epileptiform activity.  Started on DAPT with aspirin 325 mg daily and Plavix 75 mg daily plan 90-day course to be followed by monotherapy with  mg daily. Recommended Lipitor 40 mg daily (was later switched to Simvastatin 20 mg daily due to some symptoms of nostril soreness and continual dull ache to top row of teeth-worse at night).     Recommended 30 day heart monitoring which resulted negative for atrial fibrillation. Was not felt to be a candidate for PFO closure by our team but did follow-up with cardiology who agreed no indication at this time for PFO closure.     Recommended MRI/A 7T with vessel wall imaging, to further " evaluate vasculopathy which has not yet been performed as denied by insurance-this was communicated with Dr. Frances and SHORTY Rosas.  I discussed imaging further with vascular neurologist Dr. Hollingsworth today and feels 7T MRI for the unlikely to change current management given the evidence of stenoses visible on CTA.  Discussed with patient that he deferred for now we will await further from Dr. Frances he felt the imaging is necessary and receives prior authorization.  Patient just request that this be scheduled if needed prior to the end of the year as he has met his deductible.    Today seen in clinic and describes event as he had done his usual 30 push-ups as he does in the a.m. and then got in the shower.  Symptoms as described above began.  He has never had any symptoms like that prior or any recurrent episodes since.  It is possible this was a vasovagal episode given the sudden change in position and warm shower that resulted in decreased cerebral perfusion through his left sided arterial stenoses.  We discussed that the symptoms following of foot/shoulder twitching and being out of it for approximately 1 hour or more concerning for seizure and less like TIA.  Not able to rule out TIA given initial right hand/leg weakness/incoordination x15 minutes.    Did discuss his symptoms of tooth/nostril soreness, simvastatin is generally associated with increased side effects compared to atorvastatin and generally atorvastatin or rosuvastatin are preferred.  Rosuvastatin generally is better tolerated and patient is open to trying this medication instead.  He denies any fevers, night sweats, or intentional weight loss.  He intentionally has lost approximately 10 pounds due to diagnosis of prediabetes.  He denies any history of smoking.  He does report chronic daytime sleepiness throughout his entire adulthood, only occasional snoring.  Discussed that this could be a symptom of sleep apnea.  From stroke perspective this can  "be associated with increased risk of stroke due to poorly controlled blood pressures.  His blood pressure however has always been very well controlled 110/60.  He had checked it several times after hospitalization but then stopped.  Recommend that he check it twice daily for 1-2 weeks if consistently at goal then not feel strongly regarding need for sleep apnea testing.    TIA Evaluation summarized:  MRI/Head CT: MRI: No acute findings  Head vessels: CTA head: Multifocal iCAD involving R RANDAL, R supraclinoid ICA, L MCA/PCA  Neck vessels: CTA neck: No significant stenosis  Echo: TTE: EF 60-65%, normal LA size, PFO present with moderately positive bubble study, no regional WMA's  EKG/Tele: SR  LDL: 2023: 88 mg/dL  A1c: 2023: 5.7%  Other testin day heart monitor: SR throughout    ABCD2 Patients Score   Age = 58 years 1 point 1   Blood Pressure    SBP = 140 or DBP =  90     1 point 1   Clinical Features    - Unilateral weakness    - Speech disturbance w/o weakness    - Other    2 points  1 point     0 points 0   Duration of symptoms    = 60 minutes    10-59 minutes    < 10 minutes    2 points  1 point  0 points 1   Diabetes  1 point 0   Patient s ABCD2 Score (0-7) = 3         Modified Wilber Scale  Score: 0-No symptoms    Impression:   Problem List Items Addressed This Visit          Circulatory    TIA (transient ischemic attack)     66 M  Transient episode of generalized weakness, off balance, nausea, right hand/leg weakness/incoordination x 15 minutes that occurred in shower after he had just finished doing pushups prior, query possible Transient ischemic attack (TIA), ABCD2 score 3 that could have resulted from some cerebral hypoperfusion to the Left hemisphere from his intracranial atherosclerotic disease (L MCA/PCA)   Subsequent twitching/jerking movements to the right foot/shoulder with slowed responses \"out of it\" x1 hour. etiology uncertain. History is not typical for TIA, query possible seizure, EEG " was negative   Multifocal intracranial athero - unclear etiology given apparent lack of significant cerebrovascular risk factors. No history suggestive of RCVS, suspect atherosclerotic etiology (will start regular BP monitoring) vs less likely infectious or inflammatory vasculopathy  PFO - unclear if related to current symptoms, ROPE score does not apply to TIA (but if were stroke ROPE score would be 4), do not etiology of TIA    Plan:   Medications:  -Complete total 90 day course of plavix then stop  -Continue  mg daily indefinitely  -Switch simvastatin 20 mg daily to Crestor 10 mg daily     Diagnostic testing:  -Labs: repeat Lipid panel in 3 months at next follow-up     Antiplatelet (ASA/Plavix) precautions:  -Contact provider immediately with any signs of bleeding or black/tarry stools  -if any future providers request that you hold or stop taking this medication then please reach out to our stroke team to be included in the discussion.    Daily activities:  -home blood pressure monitoring twice daily AM and PM, keep log and bring to medical visits  -Mediterranean diet can be beneficial for overall decreased cardiovascular risk, moderate aerobic exercise at least 30 minutes/day x 5 days/week    Follow-up:  -Ongoing follow-up with PCP for management of vascular risk factors: BP goal <130/80, A1c goal <7%, LDL goal 40-70 (<40 increases risk of intracranial hemorrhage)  -Follow-up again with general neurology team in 3 months   -if notice that blood pressures not consistently within goal <130/80 then recommend follow-up with sleep medicine clinic to evaluate for sleep apnea    Precautions:  - Call our stroke clinic with any questions or concerns at [phone], or send a [employer] medical advice message  - Call [phone] with any new stroke symptoms    B - Balance problems  E - Eyes (vision loss)  F - Facial weakness or droop  A - Arm weakness  S - Speech/language  T - Time is brain!    Discussed with vascular  "neurology attending, Dr. Hollingsworth    Stroke Education provided.  He will call us with any questions.  For any acute neurologic deficits he was advised to  go directly to the hospital rather than call the clinic.    Paty Lucia PA-C  Neurology  10/24/2023 11:07 AM  To page me or covering stroke neurology team member, click here: [facility]COM  Choose \"On Call\" tab at top, then search dropdown box for \"Neurology Adult\" & press Enter, look for Neuro ICU/Stroke    ___________________________________________________________________    Current Medications  Current Outpatient Medications   Medication Sig     acetaminophen (TYLENOL) 325 MG tablet Take 2 tablets (650 mg) by mouth every 4 hours as needed for mild pain or fever (temperatures greater than 100.4  F (38  C))     aspirin (ASA) 325 MG EC tablet Take 1 tablet (325 mg) by mouth daily     clopidogrel (PLAVIX) 75 MG tablet Take 1 tablet (75 mg) by mouth daily     multivitamin w/minerals (THERA-VIT-M) tablet Take 1 tablet by mouth daily     Omega-3 Fatty Acids (FISH OIL) 1200 MG capsule Take 1,200 mg by mouth daily     No current facility-administered medications for this visit.       Past Medical History  No past medical history on file.    Social History  Social History     Tobacco Use     Smoking status: Never     Passive exposure: Never     Smokeless tobacco: Never   Vaping Use     Vaping Use: Never used   Substance Use Topics     Drug use: Never       Family History  No family history on file.    Physical Exam         No data to display                  General:  no acute distress appreciated  Pulmonary:  no respiratory distress appreciated    Neurologic  Mental Status:  alert, oriented x 3, follows commands, speech clear and fluent  Cranial Nerves: hearing not formally tested but intact to conversation, no dysarthria  Motor:  unable to test (telephone)  Reflexes:  unable to test (telestroke)  Sensory:  unable to test (telestroke)  Coordination:  unable to test " (telephone)  Station/Gait:  unable to test (telestroke)    Neuroimaging: as per HPI. I personally reviewed those images     Labs:    Coagulation studies:  No lab results found.     Lipid panel:  Recent Labs   Lab Test 09/08/23  0556   CHOL 139   HDL 40   LDL 88   TRIG 55       HbA1C:  Recent Labs   Lab Test 09/07/23  1022   A1C 5.7*         Billing:    I spent a total of 60 minutes on the day of the visit.   Time spent by me doing chart review, history and exam, documentation and further activities per the note    *All or a portion of this note was generated using voice recognition software and may contain transcription errors.          Again, thank you for allowing me to participate in the care of your patient.        Sincerely,        Paty Lucia PA-C

## 2023-10-26 NOTE — TELEPHONE ENCOUNTER
Received voicemail from Jhonatan with [employer] stating that the peer to peer has not yet been scheduled or completed.     Received staff message from Paty ORO, who recently saw pt and reviewed case with Dr. Hollingsworth, advising completion of a 7T MRI would likely not  at this time.     Routing to Dr. Frances and sending additional email communication asking him to please advise if he would still like to pursue the peer to peer evaluation.      Lori Dolan BS, RN, SCRN  RN Stroke Neurology Care Coordinator  [facility] Service Line

## 2024-01-18 ENCOUNTER — MYC REFILL (OUTPATIENT)
Dept: NEUROLOGY | Facility: CLINIC | Age: 59
End: 2024-01-18
Payer: COMMERCIAL

## 2024-01-18 DIAGNOSIS — G45.9 TIA (TRANSIENT ISCHEMIC ATTACK): ICD-10-CM

## 2024-01-18 RX ORDER — SIMVASTATIN 20 MG
20 TABLET ORAL AT BEDTIME
Qty: 90 TABLET | Refills: 4 | OUTPATIENT
Start: 2024-01-18

## 2024-01-18 RX ORDER — ROSUVASTATIN CALCIUM 10 MG/1
10 TABLET, COATED ORAL DAILY
Qty: 90 TABLET | Refills: 0 | Status: SHIPPED | OUTPATIENT
Start: 2024-01-18 | End: 2024-07-04

## 2024-03-29 DIAGNOSIS — R73.9 HYPERGLYCEMIA: ICD-10-CM

## 2024-03-29 DIAGNOSIS — G45.9 TIA (TRANSIENT ISCHEMIC ATTACK): Primary | ICD-10-CM

## 2024-03-29 DIAGNOSIS — I67.2 INTRACRANIAL ATHEROSCLEROSIS: ICD-10-CM

## 2024-04-15 DIAGNOSIS — G45.9 TIA (TRANSIENT ISCHEMIC ATTACK): ICD-10-CM

## 2024-04-23 ENCOUNTER — LAB (OUTPATIENT)
Dept: LAB | Facility: CLINIC | Age: 59
End: 2024-04-23
Payer: COMMERCIAL

## 2024-04-23 DIAGNOSIS — I67.2 INTRACRANIAL ATHEROSCLEROSIS: ICD-10-CM

## 2024-04-23 DIAGNOSIS — R73.9 HYPERGLYCEMIA: ICD-10-CM

## 2024-04-23 DIAGNOSIS — G45.9 TIA (TRANSIENT ISCHEMIC ATTACK): ICD-10-CM

## 2024-04-23 LAB
CHOLEST SERPL-MCNC: 136 MG/DL
FASTING STATUS PATIENT QL REPORTED: YES
HBA1C MFR BLD: 5.6 % (ref 0–5.6)
HDLC SERPL-MCNC: 54 MG/DL
LDLC SERPL CALC-MCNC: 72 MG/DL
NONHDLC SERPL-MCNC: 82 MG/DL
TRIGL SERPL-MCNC: 50 MG/DL

## 2024-04-23 PROCEDURE — 36415 COLL VENOUS BLD VENIPUNCTURE: CPT

## 2024-04-23 PROCEDURE — 83036 HEMOGLOBIN GLYCOSYLATED A1C: CPT

## 2024-04-23 PROCEDURE — 80061 LIPID PANEL: CPT

## 2024-06-02 ENCOUNTER — HEALTH MAINTENANCE LETTER (OUTPATIENT)
Age: 59
End: 2024-06-02

## 2024-07-04 ENCOUNTER — MYC REFILL (OUTPATIENT)
Dept: NEUROLOGY | Facility: CLINIC | Age: 59
End: 2024-07-04
Payer: COMMERCIAL

## 2024-07-04 DIAGNOSIS — G45.9 TIA (TRANSIENT ISCHEMIC ATTACK): ICD-10-CM

## 2024-07-09 NOTE — TELEPHONE ENCOUNTER
Per 4/23/24 result note:  Your LDL (the bad cholesterol) appears to have decreased nicely (was 88 mg/dL 8 months ago and now is 72). Will continue your cholesterol-lowering medication (Crestor/rosuvastatin) at current dose and follow-up as planned. Our goal for the LDL is to be between 40-70, so just about within goal.     Please reach out with any questions. Have a great day!     Paty Lucia PA-C  Vascular Neurology    Last OV 10/24/23 with Paty Lucia.     Last filled for 90 day 1/18/24.     Please advise on continuing refills for patient.     Carol TOUSSAINT RN, BSN  [facility]

## 2024-07-10 RX ORDER — ROSUVASTATIN CALCIUM 10 MG/1
10 TABLET, COATED ORAL DAILY
Qty: 90 TABLET | Refills: 0 | Status: SHIPPED | OUTPATIENT
Start: 2024-07-10

## 2024-07-14 RX ORDER — ROSUVASTATIN CALCIUM 10 MG/1
10 TABLET, COATED ORAL DAILY
Qty: 90 TABLET | Refills: 0 | OUTPATIENT
Start: 2024-07-14

## 2024-10-16 DIAGNOSIS — G45.9 TIA (TRANSIENT ISCHEMIC ATTACK): ICD-10-CM

## 2024-10-16 RX ORDER — ROSUVASTATIN CALCIUM 10 MG/1
10 TABLET, COATED ORAL DAILY
Qty: 90 TABLET | Refills: 0 | OUTPATIENT
Start: 2024-10-16

## 2024-10-16 NOTE — TELEPHONE ENCOUNTER
Care everywhere updated and chart reviewed. Pt saw PCP 9/13/24 and crestor refills were ordered by Dr. Winters.       Lori Dolan BS, RN, SCRN  RN Stroke Neurology Care Coordinator  [facility] Neuroscience Service Line